# Patient Record
Sex: MALE | Race: WHITE | NOT HISPANIC OR LATINO | Employment: UNEMPLOYED | URBAN - METROPOLITAN AREA
[De-identification: names, ages, dates, MRNs, and addresses within clinical notes are randomized per-mention and may not be internally consistent; named-entity substitution may affect disease eponyms.]

---

## 2023-09-29 ENCOUNTER — HOSPITAL ENCOUNTER (INPATIENT)
Facility: HOSPITAL | Age: 43
LOS: 1 days | Discharge: LEFT BEFORE DISCHARGE | DRG: 638 | End: 2023-09-29
Attending: EMERGENCY MEDICINE | Admitting: INTERNAL MEDICINE
Payer: COMMERCIAL

## 2023-09-29 VITALS
TEMPERATURE: 98 F | RESPIRATION RATE: 23 BRPM | HEIGHT: 72 IN | DIASTOLIC BLOOD PRESSURE: 89 MMHG | WEIGHT: 315 LBS | BODY MASS INDEX: 42.66 KG/M2 | SYSTOLIC BLOOD PRESSURE: 144 MMHG | OXYGEN SATURATION: 96 % | HEART RATE: 74 BPM

## 2023-09-29 DIAGNOSIS — E11.10 DKA (DIABETIC KETOACIDOSIS): ICD-10-CM

## 2023-09-29 DIAGNOSIS — E13.10 DIABETIC KETOACIDOSIS WITHOUT COMA ASSOCIATED WITH OTHER SPECIFIED DIABETES MELLITUS: Primary | ICD-10-CM

## 2023-09-29 DIAGNOSIS — M54.50 ACUTE BILATERAL LOW BACK PAIN WITHOUT SCIATICA: ICD-10-CM

## 2023-09-29 LAB
ALLENS TEST BLOOD GAS (OHS): NORMAL
AMPHET UR QL SCN: NEGATIVE
AMYLASE SERPL-CCNC: 36 UNIT/L (ref 25–125)
ANION GAP SERPL CALC-SCNC: 13 MEQ/L
ANION GAP SERPL CALC-SCNC: 18 MEQ/L
ANION GAP SERPL CALC-SCNC: 21 MEQ/L
APPEARANCE UR: CLEAR
B-OH-BUTYR SERPL-MCNC: 4.7 MMOL/L
BACTERIA #/AREA URNS AUTO: ABNORMAL /HPF
BARBITURATE SCN PRESENT UR: NEGATIVE
BASE EXCESS BLD CALC-SCNC: -1 MMOL/L
BASOPHILS # BLD AUTO: 0.05 X10(3)/MCL
BASOPHILS NFR BLD AUTO: 0.8 %
BENZODIAZ UR QL SCN: NEGATIVE
BILIRUB UR QL STRIP.AUTO: NEGATIVE
BLOOD GAS SAMPLE TYPE (OHS): NORMAL
BNP BLD-MCNC: <10 PG/ML
BUN SERPL-MCNC: 10.9 MG/DL (ref 8.9–20.6)
BUN SERPL-MCNC: 7.3 MG/DL (ref 8.9–20.6)
BUN SERPL-MCNC: 9.9 MG/DL (ref 8.9–20.6)
CALCIUM SERPL-MCNC: 9.1 MG/DL (ref 8.4–10.2)
CALCIUM SERPL-MCNC: 9.2 MG/DL (ref 8.4–10.2)
CALCIUM SERPL-MCNC: 9.2 MG/DL (ref 8.4–10.2)
CANNABINOIDS UR QL SCN: NEGATIVE
CHLORIDE SERPL-SCNC: 106 MMOL/L (ref 98–107)
CHLORIDE SERPL-SCNC: 107 MMOL/L (ref 98–107)
CHLORIDE SERPL-SCNC: 97 MMOL/L (ref 98–107)
CHOLEST SERPL-MCNC: 172 MG/DL
CHOLEST/HDLC SERPL: 8 {RATIO} (ref 0–5)
CO2 BLDA-SCNC: 27.3 MMOL/L
CO2 SERPL-SCNC: 15 MMOL/L (ref 22–29)
CO2 SERPL-SCNC: 15 MMOL/L (ref 22–29)
CO2 SERPL-SCNC: 20 MMOL/L (ref 22–29)
COCAINE UR QL SCN: NEGATIVE
COHGB MFR BLDA: 1.8 %
COLOR UR AUTO: COLORLESS
CREAT SERPL-MCNC: 0.91 MG/DL (ref 0.73–1.18)
CREAT SERPL-MCNC: 0.93 MG/DL (ref 0.73–1.18)
CREAT SERPL-MCNC: 1.3 MG/DL (ref 0.73–1.18)
CREAT/UREA NIT SERPL: 11
CREAT/UREA NIT SERPL: 12
CREAT/UREA NIT SERPL: 6
DRAWN BY BLOOD GAS (OHS): NORMAL
EOSINOPHIL # BLD AUTO: 0.12 X10(3)/MCL (ref 0–0.9)
EOSINOPHIL NFR BLD AUTO: 2 %
ERYTHROCYTE [DISTWIDTH] IN BLOOD BY AUTOMATED COUNT: 12.8 % (ref 11.5–17)
FENTANYL UR QL SCN: NEGATIVE
GFR SERPLBLD CREATININE-BSD FMLA CKD-EPI: >60 MLS/MIN/1.73/M2
GLUCOSE SERPL-MCNC: 140 MG/DL (ref 74–100)
GLUCOSE SERPL-MCNC: 150 MG/DL (ref 74–100)
GLUCOSE SERPL-MCNC: 575 MG/DL (ref 74–100)
GLUCOSE UR QL STRIP.AUTO: ABNORMAL
HCO3 BLDA-SCNC: 25.8 MMOL/L
HCT VFR BLD AUTO: 42.8 % (ref 42–52)
HDLC SERPL-MCNC: 22 MG/DL (ref 35–60)
HGB BLD-MCNC: 14.9 G/DL (ref 14–18)
HYALINE CASTS #/AREA URNS LPF: ABNORMAL /LPF
IMM GRANULOCYTES # BLD AUTO: 0.05 X10(3)/MCL (ref 0–0.04)
IMM GRANULOCYTES NFR BLD AUTO: 0.8 %
KETONES UR QL STRIP.AUTO: ABNORMAL
LDLC SERPL CALC-MCNC: -53 MG/DL (ref 50–140)
LEUKOCYTE ESTERASE UR QL STRIP.AUTO: NEGATIVE
LIPASE SERPL-CCNC: 20 U/L
LYMPHOCYTES # BLD AUTO: 2.2 X10(3)/MCL (ref 0.6–4.6)
LYMPHOCYTES NFR BLD AUTO: 36.4 %
MCH RBC QN AUTO: 28.9 PG (ref 27–31)
MCHC RBC AUTO-ENTMCNC: 34.8 G/DL (ref 33–36)
MCV RBC AUTO: 82.9 FL (ref 80–94)
MDMA UR QL SCN: NEGATIVE
METHGB MFR BLDA: 0.9 %
MONOCYTES # BLD AUTO: 0.75 X10(3)/MCL (ref 0.1–1.3)
MONOCYTES NFR BLD AUTO: 12.4 %
MUCOUS THREADS URNS QL MICRO: ABNORMAL /LPF
NEUTROPHILS # BLD AUTO: 2.88 X10(3)/MCL (ref 2.1–9.2)
NEUTROPHILS NFR BLD AUTO: 47.6 %
NITRITE UR QL STRIP.AUTO: NEGATIVE
NRBC BLD AUTO-RTO: 0 %
O2 HB BLOOD GAS (OHS): 54.8 %
OPIATES UR QL SCN: NEGATIVE
OXYHGB MFR BLDA: 13.9 G/DL
PCO2 BLDA: 50 MMHG (ref 40–50)
PCP UR QL: NEGATIVE
PH BLDA: 7.32 [PH] (ref 7.3–7.4)
PH UR STRIP.AUTO: 5 [PH]
PH UR: 5.5 [PH] (ref 3–11)
PLATELET # BLD AUTO: 219 X10(3)/MCL (ref 130–400)
PMV BLD AUTO: 11.6 FL (ref 7.4–10.4)
PO2 BLDA: 34 MMHG (ref 30–40)
POCT GLUCOSE: 142 MG/DL (ref 70–110)
POCT GLUCOSE: 142 MG/DL (ref 70–110)
POCT GLUCOSE: 146 MG/DL (ref 70–110)
POCT GLUCOSE: 150 MG/DL (ref 70–110)
POCT GLUCOSE: 161 MG/DL (ref 70–110)
POCT GLUCOSE: 164 MG/DL (ref 70–110)
POCT GLUCOSE: 242 MG/DL (ref 70–110)
POCT GLUCOSE: 314 MG/DL (ref 70–110)
POCT GLUCOSE: >500 MG/DL (ref 70–110)
POTASSIUM SERPL-SCNC: 3.6 MMOL/L (ref 3.5–5.1)
POTASSIUM SERPL-SCNC: 4.1 MMOL/L (ref 3.5–5.1)
POTASSIUM SERPL-SCNC: 4.3 MMOL/L (ref 3.5–5.1)
PROT UR QL STRIP.AUTO: NEGATIVE
RBC # BLD AUTO: 5.16 X10(6)/MCL (ref 4.7–6.1)
RBC #/AREA URNS AUTO: ABNORMAL /HPF
RBC UR QL AUTO: NEGATIVE
SAO2 % BLDA: 56.3 %
SODIUM SERPL-SCNC: 133 MMOL/L (ref 136–145)
SODIUM SERPL-SCNC: 139 MMOL/L (ref 136–145)
SODIUM SERPL-SCNC: 140 MMOL/L (ref 136–145)
SP GR UR STRIP.AUTO: 1.03 (ref 1–1.03)
SQUAMOUS #/AREA URNS LPF: ABNORMAL /HPF
T4 FREE SERPL-MCNC: 1.05 NG/DL (ref 0.7–1.48)
TRIGL SERPL-MCNC: 1013 MG/DL (ref 34–140)
TSH SERPL-ACNC: 6.66 UIU/ML (ref 0.35–4.94)
UROBILINOGEN UR STRIP-ACNC: NORMAL
VLDLC SERPL CALC-MCNC: 203 MG/DL
WBC # SPEC AUTO: 6.05 X10(3)/MCL (ref 4.5–11.5)
WBC #/AREA URNS AUTO: ABNORMAL /HPF

## 2023-09-29 PROCEDURE — 85025 COMPLETE CBC W/AUTO DIFF WBC: CPT | Performed by: EMERGENCY MEDICINE

## 2023-09-29 PROCEDURE — 25000003 PHARM REV CODE 250: Performed by: INTERNAL MEDICINE

## 2023-09-29 PROCEDURE — 96361 HYDRATE IV INFUSION ADD-ON: CPT

## 2023-09-29 PROCEDURE — 93005 ELECTROCARDIOGRAM TRACING: CPT

## 2023-09-29 PROCEDURE — 82010 KETONE BODYS QUAN: CPT | Performed by: EMERGENCY MEDICINE

## 2023-09-29 PROCEDURE — 63600175 PHARM REV CODE 636 W HCPCS: Performed by: STUDENT IN AN ORGANIZED HEALTH CARE EDUCATION/TRAINING PROGRAM

## 2023-09-29 PROCEDURE — 96372 THER/PROPH/DIAG INJ SC/IM: CPT | Performed by: EMERGENCY MEDICINE

## 2023-09-29 PROCEDURE — 96374 THER/PROPH/DIAG INJ IV PUSH: CPT

## 2023-09-29 PROCEDURE — 25000003 PHARM REV CODE 250: Performed by: STUDENT IN AN ORGANIZED HEALTH CARE EDUCATION/TRAINING PROGRAM

## 2023-09-29 PROCEDURE — 83690 ASSAY OF LIPASE: CPT | Performed by: STUDENT IN AN ORGANIZED HEALTH CARE EDUCATION/TRAINING PROGRAM

## 2023-09-29 PROCEDURE — 99223 1ST HOSP IP/OBS HIGH 75: CPT | Mod: AI,,, | Performed by: INTERNAL MEDICINE

## 2023-09-29 PROCEDURE — 84439 ASSAY OF FREE THYROXINE: CPT | Performed by: STUDENT IN AN ORGANIZED HEALTH CARE EDUCATION/TRAINING PROGRAM

## 2023-09-29 PROCEDURE — 25000003 PHARM REV CODE 250: Performed by: EMERGENCY MEDICINE

## 2023-09-29 PROCEDURE — 84443 ASSAY THYROID STIM HORMONE: CPT | Performed by: STUDENT IN AN ORGANIZED HEALTH CARE EDUCATION/TRAINING PROGRAM

## 2023-09-29 PROCEDURE — 80061 LIPID PANEL: CPT | Performed by: STUDENT IN AN ORGANIZED HEALTH CARE EDUCATION/TRAINING PROGRAM

## 2023-09-29 PROCEDURE — 80048 BASIC METABOLIC PNL TOTAL CA: CPT | Performed by: STUDENT IN AN ORGANIZED HEALTH CARE EDUCATION/TRAINING PROGRAM

## 2023-09-29 PROCEDURE — 82150 ASSAY OF AMYLASE: CPT | Performed by: STUDENT IN AN ORGANIZED HEALTH CARE EDUCATION/TRAINING PROGRAM

## 2023-09-29 PROCEDURE — 82962 GLUCOSE BLOOD TEST: CPT

## 2023-09-29 PROCEDURE — 20000000 HC ICU ROOM

## 2023-09-29 PROCEDURE — 99291 CRITICAL CARE FIRST HOUR: CPT

## 2023-09-29 PROCEDURE — S5010 5% DEXTROSE AND 0.45% SALINE: HCPCS | Performed by: STUDENT IN AN ORGANIZED HEALTH CARE EDUCATION/TRAINING PROGRAM

## 2023-09-29 PROCEDURE — 63600175 PHARM REV CODE 636 W HCPCS: Performed by: EMERGENCY MEDICINE

## 2023-09-29 PROCEDURE — 80307 DRUG TEST PRSMV CHEM ANLYZR: CPT | Performed by: STUDENT IN AN ORGANIZED HEALTH CARE EDUCATION/TRAINING PROGRAM

## 2023-09-29 PROCEDURE — 80048 BASIC METABOLIC PNL TOTAL CA: CPT | Performed by: EMERGENCY MEDICINE

## 2023-09-29 PROCEDURE — 81001 URINALYSIS AUTO W/SCOPE: CPT | Performed by: EMERGENCY MEDICINE

## 2023-09-29 PROCEDURE — 99223 PR INITIAL HOSPITAL CARE,LEVL III: ICD-10-PCS | Mod: AI,,, | Performed by: INTERNAL MEDICINE

## 2023-09-29 PROCEDURE — 83880 ASSAY OF NATRIURETIC PEPTIDE: CPT | Performed by: EMERGENCY MEDICINE

## 2023-09-29 RX ORDER — ATORVASTATIN CALCIUM 40 MG/1
40 TABLET, FILM COATED ORAL NIGHTLY
Status: DISCONTINUED | OUTPATIENT
Start: 2023-09-29 | End: 2023-09-29 | Stop reason: HOSPADM

## 2023-09-29 RX ORDER — NIFEDIPINE 30 MG/1
30 TABLET, EXTENDED RELEASE ORAL DAILY
Status: DISCONTINUED | OUTPATIENT
Start: 2023-09-29 | End: 2023-09-29 | Stop reason: HOSPADM

## 2023-09-29 RX ORDER — SODIUM CHLORIDE 0.9 % (FLUSH) 0.9 %
10 SYRINGE (ML) INJECTION
Status: DISCONTINUED | OUTPATIENT
Start: 2023-09-29 | End: 2023-09-29 | Stop reason: HOSPADM

## 2023-09-29 RX ORDER — LABETALOL HCL 20 MG/4 ML
10 SYRINGE (ML) INTRAVENOUS EVERY 4 HOURS PRN
Status: DISCONTINUED | OUTPATIENT
Start: 2023-09-29 | End: 2023-09-29 | Stop reason: HOSPADM

## 2023-09-29 RX ORDER — DEXTROSE MONOHYDRATE AND SODIUM CHLORIDE 5; .45 G/100ML; G/100ML
INJECTION, SOLUTION INTRAVENOUS CONTINUOUS PRN
Status: DISCONTINUED | OUTPATIENT
Start: 2023-09-29 | End: 2023-09-29 | Stop reason: HOSPADM

## 2023-09-29 RX ORDER — FAMOTIDINE 10 MG/ML
20 INJECTION INTRAVENOUS DAILY
Status: DISCONTINUED | OUTPATIENT
Start: 2023-09-29 | End: 2023-09-29 | Stop reason: HOSPADM

## 2023-09-29 RX ORDER — ONDANSETRON 2 MG/ML
4 INJECTION INTRAMUSCULAR; INTRAVENOUS EVERY 8 HOURS PRN
Status: DISCONTINUED | OUTPATIENT
Start: 2023-09-29 | End: 2023-09-29 | Stop reason: HOSPADM

## 2023-09-29 RX ORDER — SODIUM CHLORIDE 9 MG/ML
1000 INJECTION, SOLUTION INTRAVENOUS CONTINUOUS
Status: ACTIVE | OUTPATIENT
Start: 2023-09-29 | End: 2023-09-29

## 2023-09-29 RX ORDER — HEPARIN SODIUM 5000 [USP'U]/ML
7500 INJECTION, SOLUTION INTRAVENOUS; SUBCUTANEOUS EVERY 12 HOURS
Status: DISCONTINUED | OUTPATIENT
Start: 2023-09-29 | End: 2023-09-29 | Stop reason: HOSPADM

## 2023-09-29 RX ORDER — SODIUM CHLORIDE 9 MG/ML
1000 INJECTION, SOLUTION INTRAVENOUS
Status: COMPLETED | OUTPATIENT
Start: 2023-09-29 | End: 2023-09-29

## 2023-09-29 RX ORDER — MUPIROCIN 20 MG/G
OINTMENT TOPICAL 2 TIMES DAILY
Status: DISCONTINUED | OUTPATIENT
Start: 2023-09-29 | End: 2023-09-29 | Stop reason: HOSPADM

## 2023-09-29 RX ORDER — DEXTROSE MONOHYDRATE 100 MG/ML
INJECTION, SOLUTION INTRAVENOUS
Status: DISCONTINUED | OUTPATIENT
Start: 2023-09-29 | End: 2023-09-29 | Stop reason: HOSPADM

## 2023-09-29 RX ORDER — POTASSIUM CHLORIDE 7.45 MG/ML
40 INJECTION INTRAVENOUS
Status: DISCONTINUED | OUTPATIENT
Start: 2023-09-29 | End: 2023-09-29 | Stop reason: HOSPADM

## 2023-09-29 RX ORDER — POTASSIUM CHLORIDE 7.45 MG/ML
60 INJECTION INTRAVENOUS
Status: DISCONTINUED | OUTPATIENT
Start: 2023-09-29 | End: 2023-09-29 | Stop reason: HOSPADM

## 2023-09-29 RX ORDER — POTASSIUM CHLORIDE 20 MEQ/1
40 TABLET, EXTENDED RELEASE ORAL 2 TIMES DAILY
Status: DISCONTINUED | OUTPATIENT
Start: 2023-09-29 | End: 2023-09-29 | Stop reason: HOSPADM

## 2023-09-29 RX ORDER — KETOROLAC TROMETHAMINE 30 MG/ML
15 INJECTION, SOLUTION INTRAMUSCULAR; INTRAVENOUS
Status: COMPLETED | OUTPATIENT
Start: 2023-09-29 | End: 2023-09-29

## 2023-09-29 RX ORDER — POTASSIUM CHLORIDE 7.45 MG/ML
80 INJECTION INTRAVENOUS
Status: DISCONTINUED | OUTPATIENT
Start: 2023-09-29 | End: 2023-09-29 | Stop reason: HOSPADM

## 2023-09-29 RX ORDER — ACETAMINOPHEN 325 MG/1
650 TABLET ORAL EVERY 8 HOURS PRN
Status: DISCONTINUED | OUTPATIENT
Start: 2023-09-29 | End: 2023-09-29 | Stop reason: HOSPADM

## 2023-09-29 RX ORDER — TALC
6 POWDER (GRAM) TOPICAL NIGHTLY PRN
Status: DISCONTINUED | OUTPATIENT
Start: 2023-09-29 | End: 2023-09-29 | Stop reason: HOSPADM

## 2023-09-29 RX ADMIN — SODIUM CHLORIDE 1000 ML: 9 INJECTION, SOLUTION INTRAVENOUS at 09:09

## 2023-09-29 RX ADMIN — FAMOTIDINE 20 MG: 10 INJECTION, SOLUTION INTRAVENOUS at 09:09

## 2023-09-29 RX ADMIN — MUPIROCIN: 20 OINTMENT TOPICAL at 11:09

## 2023-09-29 RX ADMIN — KETOROLAC TROMETHAMINE 15 MG: 30 INJECTION, SOLUTION INTRAMUSCULAR; INTRAVENOUS at 06:09

## 2023-09-29 RX ADMIN — INSULIN HUMAN 0.02 UNITS/KG/HR: 1 INJECTION, SOLUTION INTRAVENOUS at 04:09

## 2023-09-29 RX ADMIN — POTASSIUM CHLORIDE 40 MEQ: 1500 TABLET, EXTENDED RELEASE ORAL at 03:09

## 2023-09-29 RX ADMIN — SODIUM CHLORIDE 1000 ML: 9 INJECTION, SOLUTION INTRAVENOUS at 08:09

## 2023-09-29 RX ADMIN — INSULIN HUMAN 0.1 UNITS/KG/HR: 1 INJECTION, SOLUTION INTRAVENOUS at 08:09

## 2023-09-29 RX ADMIN — HEPARIN SODIUM 7500 UNITS: 5000 INJECTION, SOLUTION INTRAVENOUS; SUBCUTANEOUS at 09:09

## 2023-09-29 RX ADMIN — DEXTROSE AND SODIUM CHLORIDE: 5; 450 INJECTION, SOLUTION INTRAVENOUS at 11:09

## 2023-09-29 RX ADMIN — NIFEDIPINE 30 MG: 30 TABLET, FILM COATED, EXTENDED RELEASE ORAL at 11:09

## 2023-09-29 NOTE — PLAN OF CARE
09/29/23 1420   Discharge Assessment   Assessment Type Discharge Planning Assessment   Confirmed/corrected address, phone number and insurance Yes   Confirmed Demographics Correct on Facesheet   Source of Information patient   Reason For Admission DKA (diabetic ketoacidosis)  E13.10 Diabetic ketoacidosis without coma associated with other specified diabetes mellitus  M54.50 Acute bilateral low back pain without sciatica   People in Home grandparent(s)   Facility Arrived From: Home   Do you expect to return to your current living situation? Yes   Do you have help at home or someone to help you manage your care at home? Yes   Who are your caregiver(s) and their phone number(s)? Alejandra Garcia (Mother)   [phone]   Prior to hospitilization cognitive status: Alert/Oriented   Current cognitive status: Alert/Oriented   Equipment Currently Used at Home   (Back Brace)   Readmission within 30 days? No   Patient currently being followed by outpatient case management? No   Do you currently have service(s) that help you manage your care at home? No   Do you take prescription medications? Yes   Do you have prescription coverage? Yes   Coverage [employer] M/D   Do you have any problems affording any of your prescribed medications? No   Is the patient taking medications as prescribed? yes   Who is going to help you get home at discharge? Family   How do you get to doctors appointments? car, drives self   Are you on dialysis? No   DME Needed Upon Discharge  none   Discharge Plan discussed with: Patient   Transition of Care Barriers None   Discharge Plan A Home with family   Physical Activity   On average, how many days per week do you engage in moderate to strenuous exercise (like a brisk walk)? 0 days   On average, how many minutes do you engage in exercise at this level? 0 min   Financial Resource Strain   How hard is it for you to pay for the very basics like food, housing, medical care, and heating? Not hard   Housing  Stability   In the last 12 months, was there a time when you were not able to pay the mortgage or rent on time? N   In the last 12 months, how many places have you lived? 1   In the last 12 months, was there a time when you did not have a steady place to sleep or slept in a shelter (including now)? N   Transportation Needs   In the past 12 months, has lack of transportation kept you from medical appointments or from getting medications? no   In the past 12 months, has lack of transportation kept you from meetings, work, or from getting things needed for daily living? No   Food Insecurity   Within the past 12 months, you worried that your food would run out before you got the money to buy more. Never true   Within the past 12 months, the food you bought just didn't last and you didn't have money to get more. Never true   Social Connections   In a typical week, how many times do you talk on the phone with family, friends, or neighbors? More than 3   How often do you get together with friends or relatives? More than 3   How often do you attend Methodist or Gnosticist services? More than 4  (Non Mormonism)   Do you belong to any clubs or organizations such as Methodist groups, unions, fraternal or athletic groups, or school groups? No   How often do you attend meetings of the clubs or organizations you belong to? Never   Are you , , , , never , or living with a partner? Never marrie   Alcohol Use   Q1: How often do you have a drink containing alcohol? Never   Q2: How many drinks containing alcohol do you have on a typical day when you are drinking? None   Q3: How often do you have six or more drinks on one occasion? Never   OTHER   Name(s) of People in Home Alejandra Garcia (Mother)   [phone]     Pt single with 2 sons; Resides in fly home where he cares for grandmother; Pt insured through [employer] due to work related injury with [employer], in addition to  [place] [employer]; Mother, Alejandra, emergency contact; Independent with ADL's; Home address corrected in Epic.

## 2023-09-29 NOTE — H&P
[facility] - Emergency Dept  Pulmonary Critical Care Note    Patient Name: Arnoldo Crane Jr.  MRN: [id number]  Admission Date: 9/29/2023  Hospital Length of Stay: 0 days  Code Status: Full Code  Attending Provider: Anuel Donovan Jr., MD,*  Primary Care Provider: No primary care provider on file.     Subjective:     HPI:   Arnoldo Crane Jr. is a 43 y.o. male with PMH of depression and insomnia, who presented to the ED on 9/29/2023 with CC of lower back pain that has been persisting for the past 3 weeks. He states that pain is 5 out of 10, it is intermittent, dull, and achy, it is aggravated by physical activities and partially relieved by OTC ibuprofen. He visited an urgent care clinic in August 2023 and was prescribed with pain meds, another visit made in September 2023 and was prescribed with Prednisone 10mg daily x 10days. He states that he fell from an 8 feet high platform at the age of 14 and back pain has been persisting since then. Other associating symptoms as follow: (1) Polydipsia and polyuria that have been persisting for the past 3 weeks; he drinks approximately 6-7 48oz bottle of water/day and having to urinate at least once every hour. (2) Approximately 4-5 loose stools per day for the past 3 weeks. In the ED: vital signs stable; labs significant for creatinine 1.30, bicarb 15, BHOB 4.7, serum glucose of 575, U/A revealed 3+ ketones. Admitted to ICU for DKA protocol.     Hospital Course/Significant events:  9/29/2023 - Admitted to ICU for DKA protocol     24 Hour Interval History:  Pending     No past medical history on file.    No past surgical history on file.    Social History     Socioeconomic History    Marital status: Single       No current outpatient medications  Current Inpatient Medications   famotidine (PF)  20 mg Intravenous Daily    heparin (porcine)  7,500 Units Subcutaneous Q12H     Current Intravenous Infusions   sodium chloride 0.9% 1,000 mL (09/29/23 6916)     dextrose 5 % and 0.45 % NaCl      insulin regular 1 units/mL infusion orderable (DKA) 0.1 Units/kg/hr (09/29/23 0818)     Review of Systems   Respiratory:  Negative for shortness of breath.    Cardiovascular:  Negative for chest pain and palpitations.   Gastrointestinal:  Positive for abdominal pain and nausea.   Genitourinary:  Positive for frequency.        Polydipsia   Neurological:  Negative for focal weakness.        Objective:   No intake or output data in the 24 hours ending 09/29/23 0953  Vital Signs (Most Recent):  Temp: 96.8 °F (36 °C) (09/29/23 0558)  Pulse: 84 (09/29/23 0813)  Resp: 20 (09/29/23 0558)  BP: (!) 156/93 (09/29/23 0813)  SpO2: 97 % (09/29/23 0813)  Body mass index is 43.54 kg/m².  Weight: (!) 145.6 kg (321 lb) Vital Signs (24h Range):  Temp:  [96.8 °F (36 °C)] 96.8 °F (36 °C)  Pulse:  [] 84  Resp:  [20] 20  SpO2:  [97 %-98 %] 97 %  BP: (156)/() 156/93     Physical Exam  General: Obese w/ mild distress  HEENT: NC/AT; PERRL; nasal and oral mucosa moist and clear  Pulm: CTA bilaterally, normal work of breathing on room air  CV: S1, S2 w/o murmurs or gallops; no edema noted  GI: Truncal obesity, bowel sound present in all quadrants, abdomen soft to palpation  MSK: Full ROM of all extremities  Derm: Tattoos on left arm  Neuro: AAOx3; motor/sensory function intact  Psych: Cooperative; appropriate mood and affect    Lines/Drains/Airways       Peripheral Intravenous Line  Duration                  Peripheral IV - Single Lumen 09/29/23 0730 20 G Left;Posterior Wrist <1 day                  Significant Labs:  Lab Results   Component Value Date    WBC 6.05 09/29/2023    HGB 14.9 09/29/2023    HCT 42.8 09/29/2023    MCV 82.9 09/29/2023     09/29/2023       BMP  Lab Results   Component Value Date     (L) 09/29/2023    K 4.3 09/29/2023    CHLORIDE 97 (L) 09/29/2023    CO2 15 (L) 09/29/2023    BUN 7.3 (L) 09/29/2023    CREATININE 1.30 (H) 09/29/2023    CALCIUM 9.2 09/29/2023     AGAP 21.0 09/29/2023    EGFRNONAA >60 12/10/2017     ABG  Recent Labs   Lab 09/29/23  0859   PH 7.320   PO2 34.0   PCO2 50.0   HCO3 25.8     Mechanical Ventilation Support:       Significant Imaging:  I have reviewed the pertinent imaging within the past 24 hours.    Assessment/Plan:     Assessment  DKA  Labs on admission: bicarb 15, BHOB 4.7, serum glucose of 575, U/A revealed 3+ ketones  Hypertriglyceridemia  Lipid panel on admission revealed triglyceride level of 1013  ELMER  No baseline creatinine, creatinine 1.3 on admission  Subclinical hypothyroidism  TSH 6.657 and T4 njormal at 1.05  HX of depression, insomnia    Plan  -Admitted to ICU for DKA protocol   -Will continue insulin drip until bicarb >18 with gap closed in two consecutive repeat BMPs; when gap is closed, will transition to detemir BID based on 0.25u/kg/day  -Obtained a lipid panel which revealed a triglyceride of 1013; will obtain lipase and amylase and consider CT ab/pelvis if levels are elevated    DVT Prophylaxis: Lovenox  GI Prophylaxis: Famotidine      32 minutes of critical care was time spent personally by me on the following activities: development of treatment plan with patient or surrogate and bedside caregivers, discussions with consultants, evaluation of patient's response to treatment, examination of patient, ordering and performing treatments and interventions, ordering and review of laboratory studies, ordering and review of radiographic studies, pulse oximetry, re-evaluation of patient's condition.  This critical care time did not overlap with that of any other provider or involve time for any procedures.     Melany Paredes DO, PGY-II  Pulmonary Critical Care Medicine  [facility] - Emergency Dept

## 2023-09-29 NOTE — PLAN OF CARE
Problem: Adult Inpatient Plan of Care  Goal: Plan of Care Review  Outcome: Ongoing, Progressing  Goal: Patient-Specific Goal (Individualized)  Outcome: Ongoing, Progressing  Goal: Absence of Hospital-Acquired Illness or Injury  Outcome: Ongoing, Progressing  Goal: Optimal Comfort and Wellbeing  Outcome: Ongoing, Progressing  Goal: Readiness for Transition of Care  Outcome: Ongoing, Progressing     Problem: Bariatric Environmental Safety  Goal: Safety Maintained with Care  Outcome: Ongoing, Progressing     Problem: Pain Acute  Goal: Acceptable Pain Control and Functional Ability  Outcome: Ongoing, Progressing     Problem: Behavioral Health Comorbidity  Goal: Maintenance of Behavioral Health Symptom Control  Outcome: Ongoing, Progressing     Problem: Obstructive Sleep Apnea Risk or Actual Comorbidity Management  Goal: Unobstructed Breathing During Sleep  Outcome: Ongoing, Progressing     Problem: Pain Chronic (Persistent) (Comorbidity Management)  Goal: Acceptable Pain Control and Functional Ability  Outcome: Ongoing, Progressing     Problem: Diabetes Comorbidity  Goal: Blood Glucose Level Within Targeted Range  Outcome: Ongoing, Progressing

## 2023-09-29 NOTE — PROGRESS NOTES
Inpatient Nutrition Evaluation    Admit Date: 9/29/2023   Total duration of encounter: 1 day    Nutrition Recommendation/Prescription     When appropriate--ADAT to diabetic/ cardiac  Pt education on diet/complete  MVI/fe  Biweekly wt  Will monitor nutrition status     Nutrition Assessment     Chart Review    Reason Seen: continuous nutrition monitoring    Malnutrition Screening Tool Results   Have you recently lost weight without trying?: No  Have you been eating poorly because of a decreased appetite?: No   MST Score: 0     Diagnosis:  DKA, hypertriglyceridemia, EMLER, hypothyroid     Relevant Medical History: depression     Nutrition-Related Medications: insulin, atorvastatin, famotidine     Nutrition-Related Labs:  (9/29) H/H 14.9/42.8 Gluc 575(H) Bun 7.3 Cr 1.3 Na 133(L) AST 71(H) ALT 66(H) TG 1013(H)     Diet Order: Diet NPO  Oral Supplement Order: none  Appetite/Oral Intake: NPO/NPO  Factors Affecting Nutritional Intake: NPO  Food/Latter day/Cultural Preferences: none reported  Food Allergies: none reported       Wound(s):   none    Comments    (9/29) Pt admitted with elevated gluc ; DKA; no hx DM; reported had been on high dose steroid for back injury; ? New onset DM. No wt loss; good appetite. Abnormal labs noted: Gluc (H); TG (H). Discussed diabetic diet.     Anthropometrics    Height: 6' (182.9 cm) Height Method: Stated  Last Weight: (!) 145.6 kg (321 lb) (09/29/23 0558) Weight Method: Standard Scale  BMI (Calculated): 43.5  BMI Classification: obese grade III (BMI >/=40)        Ideal Body Weight (IBW), Male: 178 lb     % Ideal Body Weight, Male (lb): 180.34 %                 Usual Body Weight (UBW), kg: (!) 145.6 kg  % Usual Body Weight: 100.21     Usual Weight Provided By: patient and EMR weight history    Wt Readings from Last 5 Encounters:   09/29/23 (!) 145.6 kg (321 lb)     Weight Change(s) Since Admission:  Admit Weight: (!) 145.6 kg (321 lb) (09/29/23 0558)  No wt loss     Patient  Education    Education Provided: diabetic diet  Teaching Method: explanation and printed materials  Comprehension: verbalizes understanding  Barriers to Learning: none evident  Expected Compliance: fair  Comments: All questions were answered and dietitian's contact information was provided.     Monitoring & Evaluation     Dietitian will monitor food and beverage intake, weight, and food/nutrition knowledge skill.  Nutrition Risk/Follow-Up: low (follow-up in 5-7 days)  Patients assigned 'low nutrition risk' status do not qualify for a full nutritional assessment but will be monitored and re-evaluated in a 5-7 day time period. Please consult if re-evaluation needed sooner.

## 2023-09-29 NOTE — ED PROVIDER NOTES
ED PROVIDER NOTE  9/29/2023    CHIEF COMPLAINT:   Chief Complaint   Patient presents with    Back Pain     Chronic back pain       HISTORY OF PRESENT ILLNESS:   Arnoldo Crane Jr. is a 43 y.o. male who presents with chief complaint Back pain.  Onset was about 4 weeks ago when he states he was going to lift something at work and felt a sharp pain across his low back.  He states that since then he is had this constant pain across his back that does not radiate into his legs that is aggravated with activity such as prolonged standing, states it improves with rest and use of cold therapy.  He reports being seen at outside facility and had some x-rays and was started on Robaxin and prednisone which he states is not helping, he takes ibuprofen on occasion but is not on any consistent NSAID regimen.  He reports that around the time of injuring his back he began noticing some increased thirst and urination along with fatigue, has had some nausea but no vomiting. Denies any dysuria or hematuria.  Denies numbness or weakness in extremities, saddle anesthesia, bowel or bladder incontinence.    The history is provided by the patient.         REVIEW OF SYSTEMS: as noted in the HPI.  NURSING NOTES REVIEWED      PAST MEDICAL/SURGICAL HISTORY: No past medical history on file. No past surgical history on file.    FAMILY HISTORY: No family history on file.    SOCIAL HISTORY:      ALLERGIES: Review of patient's allergies indicates:  No Known Allergies    PHYSICAL EXAM:  Initial Vitals [09/29/23 0558]   BP Pulse Resp Temp SpO2   (!) 156/100 108 20 96.8 °F (36 °C) 98 %      MAP       --         Physical Exam    Nursing note and vitals reviewed.  Constitutional: He appears well-developed and well-nourished. No distress.   HENT:   Head: Normocephalic and atraumatic.   Nose: Nose normal.   Mouth/Throat: Oropharynx is clear and moist and mucous membranes are normal.   Eyes: Conjunctivae and EOM are normal. Pupils are equal, round, and  reactive to light.   Neck: Neck supple. No tracheal deviation present.   Cardiovascular:  Normal rate, regular rhythm, normal heart sounds, intact distal pulses and normal pulses.           Pulmonary/Chest: Effort normal and breath sounds normal. No respiratory distress.   Abdominal: Abdomen is soft. There is no abdominal tenderness. There is no rebound and no guarding.   Musculoskeletal:      Cervical back: Neck supple.      Thoracic back: No bony tenderness.      Lumbar back: Tenderness present. No bony tenderness.        Back:      Neurological: He is alert and oriented to person, place, and time. GCS eye subscore is 4. GCS verbal subscore is 5. GCS motor subscore is 6.   CN II-XII intact. Moves all extremities. No gross sensory or motor deficits.   Skin: Skin is warm, dry and intact.   Psychiatric: He has a normal mood and affect. His speech is normal and behavior is normal. Judgment and thought content normal. Cognition and memory are normal.         RESULTS:  Labs Reviewed   BASIC METABOLIC PANEL - Abnormal; Notable for the following components:       Result Value    Sodium Level 133 (*)     Chloride 97 (*)     Carbon Dioxide 15 (*)     Glucose Level 575 (*)     Blood Urea Nitrogen 7.3 (*)     Creatinine 1.30 (*)     All other components within normal limits   URINALYSIS, REFLEX TO URINE CULTURE - Abnormal; Notable for the following components:    Color, UA Colorless (*)     Specific Gravity, UA 1.034 (*)     Glucose, UA 4+ (*)     Ketones, UA 3+ (*)     Mucous, UA Trace (*)     All other components within normal limits   CBC WITH DIFFERENTIAL - Abnormal; Notable for the following components:    MPV 11.6 (*)     IG# 0.05 (*)     All other components within normal limits   CBC W/ AUTO DIFFERENTIAL    Narrative:     The following orders were created for panel order CBC auto differential.  Procedure                               Abnormality         Status                     ---------                                -----------         ------                     CBC with Differential[[id number]]        Abnormal            Final result                 Please view results for these tests on the individual orders.   EXTRA TUBES    Narrative:     The following orders were created for panel order EXTRA TUBES.  Procedure                               Abnormality         Status                     ---------                               -----------         ------                     Light Blue Top Hold[[id number]]                              In process                 Lavender Top Hold[[id number]]                                In process                 Gold Top Hold[[id number]]                                    In process                   Please view results for these tests on the individual orders.   LIGHT BLUE TOP HOLD   LAVENDER TOP HOLD   GOLD TOP HOLD   BETA - HYDROXYBUTYRATE, SERUM   POCT GLUCOSE MONITORING CONTINUOUS     Imaging Results    None         PROCEDURES:  Critical Care    Date/Time: 9/29/2023 7:22 AM    Performed by: Maurice Crow DO  Authorized by: Eric Fonseca MD  Direct patient critical care time: 20 minutes  Additional history critical care time: 1 minutes  Ordering / reviewing critical care time: 5 minutes  Documentation critical care time: 5 minutes  Total critical care time (exclusive of procedural time) : 31 minutes  Critical care time was exclusive of separately billable procedures and treating other patients.  Critical care was necessary to treat or prevent imminent or life-threatening deterioration of the following conditions: endocrine crisis, metabolic crisis and renal failure.  Critical care was time spent personally by me on the following activities: development of treatment plan with patient or surrogate, evaluation of patient's response to treatment, examination of patient, obtaining history from patient or surrogate, ordering and performing treatments and interventions, ordering  and review of laboratory studies, re-evaluation of patient's condition and review of old charts.          ECG:  EKG Readings: (Independently Interpreted)   Initial Reading: No STEMI. Rhythm: Normal Sinus Rhythm. Heart Rate: 86. Ectopy: No Ectopy. Conduction: Normal. ST Segments: Normal ST Segments. T Waves: Normal. Clinical Impression: Normal Sinus Rhythm       ED COURSE AND MEDICAL DECISION MAKING:  Medications   sodium chloride 0.9% flush 10 mL (has no administration in time range)   0.9%  NaCl infusion (has no administration in time range)   dextrose 5 % and 0.45 % NaCl infusion (has no administration in time range)   dextrose 10 % infusion (has no administration in time range)   dextrose 10 % infusion (has no administration in time range)   insulin regular bolus from bag/infusion 14.56 Units 14.56 mL (has no administration in time range)   insulin regular in 0.9 % NaCl 100 unit/100 mL (1 unit/mL) infusion (has no administration in time range)   dextrose 10% bolus 125 mL 125 mL (has no administration in time range)   dextrose 10% bolus 250 mL 250 mL (has no administration in time range)   ketorolac injection 15 mg (15 mg Intramuscular Given 9/29/23 0629)     ED Course as of 09/29/23 0724   Fri Sep 29, 2023   0639 WBC: 6.05 [IB]   0639 Hemoglobin: 14.9 [IB]   0639 Platelets: 219 [IB]   0657 Anion Gap: 21.0 [IB]   0658 CO2(!): 15 [IB]   0658 Glucose(!!): 575 [IB]   0658 Ketones, UA(!): 3+ [IB]      ED Course User Index  [IB] Maurice Crow, DO        Medical Decision Making  44 yo male PMHx Depression/Anxiety, presents with polyuria and polydipsia over the past 3-4 weeks having fatigue. He reports that around the time that these symptoms began he had injured his low back at work lifting something and has been having some pain across his low back, does not radiate down his legs and is not having any numbness or weakness or incontinence, but he was not sure if maybe his increased thirst and urination had something to  do with his back problem.  He was on a course of prednisone but he states this was after his symptoms had began.  Discussed concern for undiagnosed diabetes given his polyuria and polydipsia so labs were obtained to evaluate for hyperglycemia.  CBC is unremarkable.  BMP shows glucose 575 with bicarb 15, anion gap 21, K 4.3, creatinine 1.30, this was 1.03 back in 2017.  UA shows 4+ glucose with 3+ ketones, no evidence of infection.  Labs consistent with DKA.  We will check VBG and beta hydroxybutyrate and start on insulin bolus and drip.  Labs discussed with the patient and need for admission.  I have spoken with the patient and/or caregivers. I have explained the patient's condition, diagnoses and treatment plan based on the information available to me at this time. I have answered the patient's and/or caregiver's questions and addressed any concerns. The patient and/or caregivers have as good an understanding of the patient's diagnosis, condition and treatment plan as can be expected at this point. The patient has been stabilized within the capability of the emergency department. The patient will be transported for further care and management or will be moved to an observation or inpatient service. I have communicated with the staff or medical practitioner taking over this patient's care.    Amount and/or Complexity of Data Reviewed  External Data Reviewed: labs, radiology and notes.  Labs: ordered. Decision-making details documented in ED Course.    Risk  Prescription drug management.  Drug therapy requiring intensive monitoring for toxicity.  Decision regarding hospitalization.    Critical Care  Total time providing critical care: 31 minutes        CLINICAL IMPRESSION:  1. Diabetic ketoacidosis without coma associated with other specified diabetes mellitus    2. Acute bilateral low back pain without sciatica    3. DKA (diabetic ketoacidosis)        DISPOSITION:   ED Disposition Condition    Admit Stable                     Maurice Crow,   09/29/23 0725       Eric Fonseca MD  09/29/23 0800

## 2023-09-30 LAB — POCT GLUCOSE: 144 MG/DL (ref 70–110)

## 2023-09-30 NOTE — DISCHARGE SUMMARY
[facility] LEAVING AGAINST MEDICAL ADVICE NOTE    Admitting Physician: Anuel Donovan Jr., MD, [facility]  Attending Physician: No att. providers found  Date of Admit: 9/29/2023  Date of AMA: 9/29/2023    Brief History of Present Illness      Arnoldo Crane Jr. is a 43 y.o. male with PMH of depression and insomnia, who presented to the ED on 9/29/2023 with CC of lower back pain that has been persisting for the past 3 weeks. He states that pain is 5 out of 10, it is intermittent, dull, and achy, it is aggravated by physical activities and partially relieved by OTC ibuprofen. He visited an urgent care clinic in August 2023 and was prescribed with pain meds, another visit made in September 2023 and was prescribed with Prednisone 10mg daily x 10days. He states that he fell from an 8 feet high platform at the age of 14 and back pain has been persisting since then. Other associating symptoms as follow: (1) Polydipsia and polyuria that have been persisting for the past 3 weeks; he drinks approximately 6-7 48oz bottle of water/day and having to urinate at least once every hour. (2) Approximately 4-5 loose stools per day for the past 3 weeks. In the ED: vital signs stable; labs significant for creatinine 1.30, bicarb 15, BHOB 4.7, serum glucose of 575, U/A revealed 3+ ketones. Admitted to ICU for DKA protocol.     Discharge Information:     This patient is choosing to leave against medical advice despite being actively treated for DKA and hypertriglyceridemia. I have personally explained to patient the risks and that choosing to do so may result in permanent bodily harm or even death. Discussed at great length that without further evaluation and monitoring there may have unforeseen circumstances and/or deterioration causing permanent bodily harm or death as a result of leaving against medical advice. Patient verbalizes these risks back to me in layman terms. Patient still desires to leave against medical advice. Patient is alert,  oriented, and shows the mental capacity to make clear decisions regarding his health care at this time. He refused any follow-up for further evaluation of his medical condition. Advised patient to return to ED at any time immediately if he changes mind at any time or if condition begins to worsen or change in anyway.     Melany Paredes,   [facility] Internal Medicine PGY-II

## 2023-10-02 ENCOUNTER — PATIENT MESSAGE (OUTPATIENT)
Dept: FAMILY MEDICINE | Facility: CLINIC | Age: 43
End: 2023-10-02
Payer: MEDICAID

## 2023-10-11 ENCOUNTER — CLINICAL SUPPORT (OUTPATIENT)
Dept: FAMILY MEDICINE | Facility: CLINIC | Age: 43
End: 2023-10-11
Attending: NURSE PRACTITIONER
Payer: MEDICAID

## 2023-10-11 ENCOUNTER — OFFICE VISIT (OUTPATIENT)
Dept: FAMILY MEDICINE | Facility: CLINIC | Age: 43
End: 2023-10-11
Payer: MEDICAID

## 2023-10-11 VITALS
SYSTOLIC BLOOD PRESSURE: 105 MMHG | BODY MASS INDEX: 42.66 KG/M2 | HEART RATE: 86 BPM | TEMPERATURE: 98 F | WEIGHT: 315 LBS | OXYGEN SATURATION: 95 % | HEIGHT: 72 IN | DIASTOLIC BLOOD PRESSURE: 72 MMHG

## 2023-10-11 DIAGNOSIS — F41.9 ANXIETY AND DEPRESSION: ICD-10-CM

## 2023-10-11 DIAGNOSIS — F51.04 PSYCHOPHYSIOLOGICAL INSOMNIA: ICD-10-CM

## 2023-10-11 DIAGNOSIS — F32.A ANXIETY AND DEPRESSION: ICD-10-CM

## 2023-10-11 DIAGNOSIS — E11.65 TYPE 2 DIABETES MELLITUS WITH HYPERGLYCEMIA, WITHOUT LONG-TERM CURRENT USE OF INSULIN: ICD-10-CM

## 2023-10-11 DIAGNOSIS — B35.3 TINEA PEDIS OF BOTH FEET: ICD-10-CM

## 2023-10-11 DIAGNOSIS — Z13.31 POSITIVE DEPRESSION SCREENING: ICD-10-CM

## 2023-10-11 DIAGNOSIS — E11.65 TYPE 2 DIABETES MELLITUS WITH HYPERGLYCEMIA, WITHOUT LONG-TERM CURRENT USE OF INSULIN: Primary | ICD-10-CM

## 2023-10-11 LAB
ALBUMIN SERPL-MCNC: 4.2 G/DL (ref 3.5–5)
ALBUMIN/GLOB SERPL: 0.8 RATIO (ref 1.1–2)
ALP SERPL-CCNC: 73 UNIT/L (ref 40–150)
ALT SERPL-CCNC: 35 UNIT/L (ref 0–55)
AST SERPL-CCNC: 19 UNIT/L (ref 5–34)
BILIRUB SERPL-MCNC: 0.7 MG/DL
BUN SERPL-MCNC: 13 MG/DL (ref 8.9–20.6)
CALCIUM SERPL-MCNC: 10.1 MG/DL (ref 8.4–10.2)
CHLORIDE SERPL-SCNC: 98 MMOL/L (ref 98–107)
CO2 SERPL-SCNC: 18 MMOL/L (ref 22–29)
CREAT SERPL-MCNC: 1.04 MG/DL (ref 0.73–1.18)
CREAT UR-MCNC: 73.4 MG/DL (ref 63–166)
EST. AVERAGE GLUCOSE BLD GHB EST-MCNC: 277.6 MG/DL
GFR SERPLBLD CREATININE-BSD FMLA CKD-EPI: >60 MLS/MIN/1.73/M2
GLOBULIN SER-MCNC: 5.4 GM/DL (ref 2.4–3.5)
GLUCOSE SERPL-MCNC: 366 MG/DL (ref 74–100)
HBA1C MFR BLD: 11.3 %
MICROALBUMIN UR-MCNC: 22.9 UG/ML
MICROALBUMIN/CREAT RATIO PNL UR: 31.2 MG/GM CR (ref 0–30)
POTASSIUM SERPL-SCNC: 4.5 MMOL/L (ref 3.5–5.1)
PROT SERPL-MCNC: 9.6 GM/DL (ref 6.4–8.3)
SODIUM SERPL-SCNC: 133 MMOL/L (ref 136–145)

## 2023-10-11 PROCEDURE — 1111F PR DISCHARGE MEDS RECONCILED W/ CURRENT OUTPATIENT MED LIST: ICD-10-PCS | Mod: CPTII,,, | Performed by: NURSE PRACTITIONER

## 2023-10-11 PROCEDURE — 84681 ASSAY OF C-PEPTIDE: CPT | Performed by: NURSE PRACTITIONER

## 2023-10-11 PROCEDURE — 99204 PR OFFICE/OUTPT VISIT, NEW, LEVL IV, 45-59 MIN: ICD-10-PCS | Mod: S$PBB,,, | Performed by: NURSE PRACTITIONER

## 2023-10-11 PROCEDURE — 99204 OFFICE O/P NEW MOD 45 MIN: CPT | Mod: S$PBB,,, | Performed by: NURSE PRACTITIONER

## 2023-10-11 PROCEDURE — 1160F PR REVIEW ALL MEDS BY PRESCRIBER/CLIN PHARMACIST DOCUMENTED: ICD-10-PCS | Mod: CPTII,,, | Performed by: NURSE PRACTITIONER

## 2023-10-11 PROCEDURE — 80053 COMPREHEN METABOLIC PANEL: CPT | Performed by: NURSE PRACTITIONER

## 2023-10-11 PROCEDURE — 36415 COLL VENOUS BLD VENIPUNCTURE: CPT | Performed by: NURSE PRACTITIONER

## 2023-10-11 PROCEDURE — 1160F RVW MEDS BY RX/DR IN RCRD: CPT | Mod: CPTII,,, | Performed by: NURSE PRACTITIONER

## 2023-10-11 PROCEDURE — 3008F BODY MASS INDEX DOCD: CPT | Mod: CPTII,,, | Performed by: NURSE PRACTITIONER

## 2023-10-11 PROCEDURE — 83036 HEMOGLOBIN GLYCOSYLATED A1C: CPT | Performed by: NURSE PRACTITIONER

## 2023-10-11 PROCEDURE — 92228 IMG RTA DETC/MNTR DS PHY/QHP: CPT | Mod: PBBFAC

## 2023-10-11 PROCEDURE — 1159F MED LIST DOCD IN RCRD: CPT | Mod: CPTII,,, | Performed by: NURSE PRACTITIONER

## 2023-10-11 PROCEDURE — 3008F PR BODY MASS INDEX (BMI) DOCUMENTED: ICD-10-PCS | Mod: CPTII,,, | Performed by: NURSE PRACTITIONER

## 2023-10-11 PROCEDURE — 1111F DSCHRG MED/CURRENT MED MERGE: CPT | Mod: CPTII,,, | Performed by: NURSE PRACTITIONER

## 2023-10-11 PROCEDURE — 3074F PR MOST RECENT SYSTOLIC BLOOD PRESSURE < 130 MM HG: ICD-10-PCS | Mod: CPTII,,, | Performed by: NURSE PRACTITIONER

## 2023-10-11 PROCEDURE — 82043 UR ALBUMIN QUANTITATIVE: CPT | Performed by: NURSE PRACTITIONER

## 2023-10-11 PROCEDURE — 3078F PR MOST RECENT DIASTOLIC BLOOD PRESSURE < 80 MM HG: ICD-10-PCS | Mod: CPTII,,, | Performed by: NURSE PRACTITIONER

## 2023-10-11 PROCEDURE — 3074F SYST BP LT 130 MM HG: CPT | Mod: CPTII,,, | Performed by: NURSE PRACTITIONER

## 2023-10-11 PROCEDURE — 3078F DIAST BP <80 MM HG: CPT | Mod: CPTII,,, | Performed by: NURSE PRACTITIONER

## 2023-10-11 PROCEDURE — 99214 OFFICE O/P EST MOD 30 MIN: CPT | Mod: PBBFAC | Performed by: NURSE PRACTITIONER

## 2023-10-11 PROCEDURE — 1159F PR MEDICATION LIST DOCUMENTED IN MEDICAL RECORD: ICD-10-PCS | Mod: CPTII,,, | Performed by: NURSE PRACTITIONER

## 2023-10-11 RX ORDER — METFORMIN HYDROCHLORIDE 500 MG/1
500 TABLET ORAL 2 TIMES DAILY WITH MEALS
Qty: 180 TABLET | Refills: 3 | Status: SHIPPED | OUTPATIENT
Start: 2023-10-11 | End: 2023-11-08

## 2023-10-11 RX ORDER — INSULIN PUMP SYRINGE, 3 ML
EACH MISCELLANEOUS
Qty: 1 EACH | Refills: 0 | Status: SHIPPED | OUTPATIENT
Start: 2023-10-11 | End: 2023-10-13 | Stop reason: SDUPTHER

## 2023-10-11 RX ORDER — LANCETS
EACH MISCELLANEOUS
Qty: 200 EACH | Refills: 1 | Status: SHIPPED | OUTPATIENT
Start: 2023-10-11 | End: 2023-10-13

## 2023-10-11 RX ORDER — ZOLPIDEM TARTRATE 12.5 MG/1
12.5 TABLET, FILM COATED, EXTENDED RELEASE ORAL NIGHTLY PRN
Qty: 30 TABLET | Refills: 3 | Status: SHIPPED | OUTPATIENT
Start: 2023-10-11 | End: 2023-10-13 | Stop reason: SDUPTHER

## 2023-10-11 RX ORDER — MICONAZOLE NITRATE 2 %
POWDER (GRAM) TOPICAL 2 TIMES DAILY
Qty: 71 G | Refills: 1 | Status: SHIPPED | OUTPATIENT
Start: 2023-10-11 | End: 2023-10-25

## 2023-10-11 RX ORDER — ROSUVASTATIN CALCIUM 5 MG/1
5 TABLET, COATED ORAL NIGHTLY
Qty: 90 TABLET | Refills: 3 | Status: SHIPPED | OUTPATIENT
Start: 2023-10-11 | End: 2024-10-10

## 2023-10-11 NOTE — PATIENT INSTRUCTIONS
Jaspreet Mclaughlin,     If you are due for any health screening(s) below please notify me so we can arrange them to be ordered and scheduled. Most healthy patients at your age complete them, but you are free to accept or refuse.     If you can't do it, I'll definitely understand. If you can, I'd certainly appreciate it!    Tests to Keep You Healthy    Eye Exam: ORDERED BUT NOT SCHEDULED  Last HbA1c < 8 (The patient doesn't have any registry metric data available): NO      Your diabetic retinal eye exam is due     Diabetes is the #1 cause of blindness in the [place] - early detection before signs or symptoms develop can prevent debilitating blindness.     Our records indicate that you may be overdue for your annual diabetic eye exam. Eye screening can help identify patients at risk for developing vision loss which is common in diabetes. This simple screening is an important step to keeping you healthy and preventing complications from diabetes.     This recommended diabetic eye exam should take place once per year and can prevent and treat diabetes complications in the eye before developing symptoms. This can be done with a special camera is used to take photographs of the back of your eye without having to dilate them, or you can see an eye doctor for a full dilated exam.     If you recently had your yearly diabetic eye exam performed outside of [facility], please let your Health care team know so that they can update your health record.

## 2023-10-11 NOTE — ASSESSMENT & PLAN NOTE
Patient was offered trazodone, declined because it did not help in the past and also was sleepy for days after taking trazodone.  Does not remember trazodone dose.  Patient state he has been benefitting from Ambien 12.5 controlled release and would like to continue medication.  Patient state it makes him fall asleep easy and maintain sleep throughout the night.  Rx Ambien 12.5 controlled release sent to preferred pharmacy and to take as directed.  Precaution Discussed.  Questions solicited and answered, patient verbalized and agreed to plan.  Regarding insomnia medication it has been rerouted to [facility] since the patient having difficulty filling his medication at [facility].  Questions solicited answered, patient verbalized.  Keep follow-up appointment as directed.  Patient verbalized.

## 2023-10-11 NOTE — ASSESSMENT & PLAN NOTE
PHQ-9 score is 13, josé miguel-7 score is 12.  Patient state he has been on Zoloft 100 mg p.o. once daily.  Patient state he feels that is not working like it should be.  Discussed with patient to continue with same dose and [facility] and [facility].  Patient agreed to plan and verbalized understanding.  Patient to read discharge education materials.

## 2023-10-11 NOTE — PROGRESS NOTES
Arnoldo Gonzalez Royce Henderson is a 43 y.o. male here for a diabetic eye screening with non-dilated fundus photos per Rowena Treviño .    Patient cooperative?: Yes  Small pupils?: No  Last eye exam: Unknown    For exam results, see Encounter Report.

## 2023-10-11 NOTE — PROGRESS NOTES
Patient Name: Arnoldo Crane Jr.   : 1980  MRN: [id number]     SUBJECTIVE DATA:    CHIEF COMPLAINT:   Arnoldo Crane Jr. is a 43 y.o. male who presents to clinic today with Diabetes        HPI:  43-year-old male presents to the clinic to follow-up on diabetes, depression with anxiety and insomnia.    Diabetes:  Patient state he was given a prednisone to manage lower back strain and that is when he started feeling bad.  To [facility] emergency department and his blood sugar were elevated.  Patient was diagnosed with DKA and was admitted to the hospital for management but he ended up leaving against medical advice.  Patient is here today to manage diabetes and to start on medications.  Discussed with patient will initiate hemoglobin A1c, urine microalbumin, foot exam, eye exam.  Also discussed Rx metformin 500 mg p.o. b.i.d. as a start and to check his sugars twice a day.  Discussed with patient will results of hemoglobin A1c becomes available will adjust and add medications as needed.  Patient to return to clinic in 4 weeks for follow-up.  Patient to read discharge education materials.  Questions solicited and answered, patient verbalized and agreed to plan.  10/13/2023 addendum:  Hemoglobin A1c 11.3%.  Spoke to patient over the phone, added 2nd medicine Rx glipizide 5 mg extended release p.o. once daily with breakfast.  Also recent glucometer equipment to [facility] pharmacy since he is having difficulties getting filled at [facility].  Regarding insomnia medication it has been rerouted to [facility] since the patient having difficulty filling his medication at [facility].  Questions solicited answered, patient verbalized.  Keep follow-up appointment as directed.  Patient verbalized.    Anxiety and depression:  PHQ-9 score is 13, josé miguel-7 score is 12.  Patient state he has been on Zoloft 100 mg p.o. once daily.  Patient state he feels that is not working like  it should be.  Discussed with patient to continue with same dose and [facility] and management.  Patient agreed to plan and verbalized understanding.  Patient to read discharge education materials.    Insomnia:  Patient was offered trazodone, declined because it did not help in the past and also was sleepy for days after taking trazodone.  Does not remember trazodone dose.  Patient state he has been benefitting from Ambien 12.5 controlled release and would like to continue medication.  Patient state it makes him fall asleep easy and maintain sleep throughout the night.  Rx Ambien 12.5 controlled release sent to preferred pharmacy and to take as directed.  Precaution Discussed.  Questions solicited and answered, patient verbalized and agreed to plan.    Statin management:  The 10-year ASCVD risk score (Augusto DIALLO, et al., 2019) is: 4%    Values used to calculate the score:      Age: 43 years      Sex: Male      Is Non- : No      Diabetic: Yes      Tobacco smoker: No      Systolic Blood Pressure: 105 mmHg      Is BP treated: No      HDL Cholesterol: 22 mg/dL      Total Cholesterol: 172 mg/dL   Discussed with patient will initiate a low-dose statin Crestor 5 mg p.o. once at bedtime daily.  Follow low-cholesterol, low-fat diet.  Questions solicited and answered, patient verbalized and agreed to plan.    Tinea pedis:  Discussed with patient due to uncontrolled diabetes and foot moisturizer it increase risk to develop fungal infection.  Discussed Rx Zeasorb topical powder twice a day 14 days and then as needed.  Also to make sure to be applied between toe webs.  Change socks frequently if needed to maintain low moisture.  Questions solicited and answered, patient verbalized and agreed to plan.    Patient denies chest pain, shortness of breath, dyspnea on exertion, palpitations, peripheral edema, abdominal pain, nausea, vomiting, diarrhea, constipation,  fatigue, fever, chills, dysuria,  hematuria, melena, or hematochezia.         ALLERGIES: Review of patient's allergies indicates:  No Known Allergies      ROS:  Review of Systems   Constitutional:         Morbid obese.  Diabetes.   Skin:  Positive for itching and rash.   Psychiatric/Behavioral:  Positive for depression. The patient is nervous/anxious and has insomnia.    All other systems reviewed and are negative.        OBJECTIVE DATA:  Vital signs  Vitals:    10/11/23 1253   BP: 105/72   BP Location: Right arm   Patient Position: Sitting   BP Method: Large (Automatic)   Pulse: 86   Temp: 98.2 °F (36.8 °C)   TempSrc: Oral   SpO2: 95%   Weight: (!) 145.3 kg (320 lb 6.4 oz)   Height: 6' (1.829 m)      Body mass index is 43.45 kg/m².    PHYSICAL EXAM:   Physical Exam  Vitals and nursing note reviewed.   Constitutional:       General: He is awake. He is not in acute distress.     Appearance: Normal appearance. He is well-developed. He is morbidly obese. He is not ill-appearing, toxic-appearing or diaphoretic.   HENT:      Head: Normocephalic and atraumatic.      Right Ear: Ear canal and external ear normal. Tympanic membrane is scarred (Chronic from multiple ear infections and tubes as a child.).      Left Ear: Ear canal and external ear normal. Tympanic membrane is scarred (Chronic for multiple ear infections and tubes as a child.) and perforated (Chronic).      Nose: Nose normal.      Mouth/Throat:      Lips: Pink.      Mouth: Mucous membranes are moist.      Tongue: No lesions. Tongue does not deviate from midline.      Palate: No mass and lesions.      Pharynx: Oropharynx is clear. Uvula midline.   Eyes:      General: Lids are normal.      Extraocular Movements: Extraocular movements intact.      Conjunctiva/sclera: Conjunctivae normal.      Pupils: Pupils are equal, round, and reactive to light.   Neck:      Trachea: Trachea and phonation normal.   Cardiovascular:      Rate and Rhythm: Normal rate and regular  rhythm.      Pulses: Normal pulses.           Carotid pulses are 2+ on the right side and 2+ on the left side.       Radial pulses are 2+ on the right side and 2+ on the left side.        Dorsalis pedis pulses are 2+ on the right side and 2+ on the left side.        Posterior tibial pulses are 2+ on the right side and 2+ on the left side.      Heart sounds: Normal heart sounds. No murmur heard.  Pulmonary:      Effort: Pulmonary effort is normal.      Breath sounds: Normal breath sounds and air entry. No wheezing or rhonchi.   Abdominal:      Palpations: Abdomen is soft.      Tenderness: There is no abdominal tenderness. There is no right CVA tenderness or left CVA tenderness.   Musculoskeletal:         General: Normal range of motion.      Cervical back: Normal range of motion and neck supple. No rigidity or tenderness.      Right lower leg: No edema.      Left lower leg: No edema.      Right foot: Normal range of motion. No deformity, bunion, Charcot foot, foot drop or prominent metatarsal heads.      Left foot: Normal range of motion. No deformity, bunion, Charcot foot, foot drop or prominent metatarsal heads.   Feet:      Right foot:      Protective Sensation: 10 sites tested.  10 sites sensed.      Skin integrity: Skin breakdown and erythema present.      Toenail Condition: Fungal disease present.     Left foot:      Protective Sensation: 10 sites tested.  10 sites sensed.      Skin integrity: Skin breakdown and erythema present.      Toenail Condition: Fungal disease present.     Comments: Patient wearing appropriate footwear.  Inspect feet daily if possible.  Tinea pedis noted.  Apply medication as directed.  Lymphadenopathy:      Cervical: No cervical adenopathy.   Skin:     General: Skin is warm.      Capillary Refill: Capillary refill takes less than 2 seconds.      Findings: Rash present.   Neurological:      General: No focal deficit present.      Mental Status: He is alert and oriented to person, place,  and time. Mental status is at baseline.      GCS: GCS eye subscore is 4. GCS verbal subscore is 5. GCS motor subscore is 6.      Cranial Nerves: No cranial nerve deficit.      Sensory: No sensory deficit.      Motor: No weakness.      Coordination: Coordination normal.      Gait: Gait normal.   Psychiatric:         Attention and Perception: Attention and perception normal.         Mood and Affect: Mood and affect normal.         Speech: Speech normal.         Behavior: Behavior normal. Behavior is cooperative.         Thought Content: Thought content normal.         Cognition and Memory: Cognition and memory normal.         Judgment: Judgment normal.          ASSESSMENT/PLAN:  1. Type 2 diabetes mellitus with hyperglycemia, without long-term current use of insulin  Assessment & Plan:  Patient state he was given a prednisone to manage lower back strain and that is when he started feeling bad.  To [facility] emergency department and his blood sugar were elevated.  Patient was diagnosed with DKA and was admitted to the hospital for management but he ended up leaving against medical advice.  Patient is here today to manage diabetes and to start on medications.  Discussed with patient will initiate hemoglobin A1c, urine microalbumin, foot exam, eye exam.  Also discussed Rx metformin 500 mg p.o. b.i.d. as a start and to check his sugars twice a day.  Discussed with patient will results of hemoglobin A1c becomes available will adjust and add medications as needed.  Patient to return to clinic in 4 weeks for follow-up.  Patient to read discharge education materials.  Questions solicited and answered, patient verbalized and agreed to plan.  Hemoglobin A1c 11.3%.  Spoke to patient over the phone, added 2nd medicine Rx glipizide 5 mg extended release p.o. once daily with breakfast.  Also recent glucometer equipment to [facility] pharmacy since he is having difficulties getting filled at  [facility].  Regarding insomnia medication it has been rerouted to [facility] since the patient having difficulty filling his medication at [facility].  Questions solicited answered, patient verbalized.  Keep follow-up appointment as directed.  Patient verbalized.    Orders:  -     C-Peptide  -     Hemoglobin A1C  -     Microalbumin/Creatinine Ratio, Urine  -     Comprehensive Metabolic Panel  -     Diabetic Eye Screening Photo; Future; Expected date: 10/11/2023  -     Foot Exam Performed  -     metFORMIN (GLUCOPHAGE) 500 MG tablet; Take 1 tablet (500 mg total) by mouth 2 (two) times daily with meals.  Dispense: 180 tablet; Refill: 3  -     rosuvastatin (CRESTOR) 5 MG tablet; Take 1 tablet (5 mg total) by mouth every evening.  Dispense: 90 tablet; Refill: 3  -     Discontinue: blood-glucose meter kit; To check BG 2 times daily, to use with insurance preferred meter  Dispense: 1 each; Refill: 0  -     Discontinue: lancets Misc; To check BG 2 times daily, to use with insurance preferred meter  Dispense: 200 each; Refill: 1  -     Discontinue: blood sugar diagnostic Strp; To check BG 2 times daily, to use with insurance preferred meter  Dispense: 200 each; Refill: 1  -     glipiZIDE 5 MG TR24; Take 1 tablet (5 mg total) by mouth daily with breakfast.  Dispense: 90 tablet; Refill: 0  -     lancets Misc; To check BG 2 times daily, to use with insurance preferred meter  Dispense: 200 each; Refill: 1  -     blood-glucose meter kit; To check BG 2 times daily, to use with insurance preferred meter  Dispense: 1 each; Refill: 0  -     blood sugar diagnostic Strp; To check BG 2 times daily, to use with insurance preferred meter  Dispense: 200 each; Refill: 1    2. Anxiety and depression  Assessment & Plan:  PHQ-9 score is 13, josé miguel-7 score is 12.  Patient state he has been on Zoloft 100 mg p.o. once daily.  Patient state he feels that is not working like it should be.  Discussed with patient to continue with same dose  and will [facility] and management.  Patient agreed to plan and verbalized understanding.  Patient to read discharge education materials.      3. Positive depression screening  Comments:  I have reviewed the positive depression score which warrants active treatment with psychotherapy and/or medications.  Overview:  I have reviewed the positive depression score which warrants active treatment with psychotherapy and/or medications.    Orders:  -     Ambulatory referral/consult to Psychiatry; Future; Expected date: 10/18/2023    4. Psychophysiological insomnia  Assessment & Plan:   Patient was offered trazodone, declined because it did not help in the past and also was sleepy for days after taking trazodone.  Does not remember trazodone dose.  Patient state he has been benefitting from Ambien 12.5 controlled release and would like to continue medication.  Patient state it makes him fall asleep easy and maintain sleep throughout the night.  Rx Ambien 12.5 controlled release sent to preferred pharmacy and to take as directed.  Precaution Discussed.  Questions solicited and answered, patient verbalized and agreed to plan.  Regarding insomnia medication it has been rerouted to [facility] since the patient having difficulty filling his medication at [facility].  Questions solicited answered, patient verbalized.  Keep follow-up appointment as directed.  Patient verbalized.    Orders:  -     Discontinue: zolpidem (AMBIEN CR) 12.5 MG CR tablet; Take 1 tablet (12.5 mg total) by mouth nightly as needed for Insomnia.  Dispense: 30 tablet; Refill: 3  -     Ambulatory referral/consult to Psychiatry; Future; Expected date: 10/18/2023  -     zolpidem (AMBIEN CR) 12.5 MG CR tablet; Take 1 tablet (12.5 mg total) by mouth nightly as needed for Insomnia.  Dispense: 30 tablet; Refill: 3    5. Tinea pedis of both feet  Assessment & Plan:   Discussed with patient due to uncontrolled  diabetes and foot moisturizer it increase risk to develop fungal infection.  Discussed Rx Zeasorb topical powder twice a day 14 days and then as needed.  Also to make sure to be applied between toe webs.  Change socks frequently if needed to maintain low moisture.  Questions solicited and answered, patient verbalized and agreed to plan.    Orders:  -     miconazole NITRATE 2 % (ZEASORB AF) 2 % top powder; Apply topically 2 (two) times daily. And then as needed.  Make sure to apply between toe webs. for 14 days  Dispense: 71 g; Refill: 1           RESULTS:  Recent Results (from the past 1008 hour(s))   Basic metabolic panel    Collection Time: 09/29/23  6:20 AM   Result Value Ref Range    Sodium Level 133 (L) 136 - 145 mmol/L    Potassium Level 4.3 3.5 - 5.1 mmol/L    Chloride 97 (L) 98 - 107 mmol/L    Carbon Dioxide 15 (L) 22 - 29 mmol/L    Glucose Level 575 (HH) 74 - 100 mg/dL    Blood Urea Nitrogen 7.3 (L) 8.9 - 20.6 mg/dL    Creatinine 1.30 (H) 0.73 - 1.18 mg/dL    BUN/Creatinine Ratio 6     Calcium Level Total 9.2 8.4 - 10.2 mg/dL    Anion Gap 21.0 mEq/L    eGFR >60 mls/min/1.73/m2   CBC with Differential    Collection Time: 09/29/23  6:20 AM   Result Value Ref Range    WBC 6.05 4.50 - 11.50 x10(3)/mcL    RBC 5.16 4.70 - 6.10 x10(6)/mcL    Hgb 14.9 14.0 - 18.0 g/dL    Hct 42.8 42.0 - 52.0 %    MCV 82.9 80.0 - 94.0 fL    MCH 28.9 27.0 - 31.0 pg    MCHC 34.8 33.0 - 36.0 g/dL    RDW 12.8 11.5 - 17.0 %    Platelet 219 130 - 400 x10(3)/mcL    MPV 11.6 (H) 7.4 - 10.4 fL    Neut % 47.6 %    Lymph % 36.4 %    Mono % 12.4 %    Eos % 2.0 %    Basophil % 0.8 %    Lymph # 2.20 0.6 - 4.6 x10(3)/mcL    Neut # 2.88 2.1 - 9.2 x10(3)/mcL    Mono # 0.75 0.1 - 1.3 x10(3)/mcL    Eos # 0.12 0 - 0.9 x10(3)/mcL    Baso # 0.05 <=0.2 x10(3)/mcL    IG# 0.05 (H) 0 - 0.04 x10(3)/mcL    IG% 0.8 %    NRBC% 0.0 %   Beta-Hydroxybutyrate, Serum    Collection Time: 09/29/23  6:20 AM   Result Value Ref Range    Beta Hydroxybutyrate 4.70 (H)  <=0.30 mmol/L   Lipid Panel    Collection Time: 09/29/23  6:20 AM   Result Value Ref Range    Cholesterol Total 172 <=200 mg/dL    HDL Cholesterol 22 (L) 35 - 60 mg/dL    Triglyceride 1,013 (H) 34 - 140 mg/dL    Cholesterol/HDL Ratio 8 (H) 0 - 5    Very Low Density Lipoprotein 203     LDL Cholesterol -53.00 (L) 50.00 - 140.00 mg/dL   TSH    Collection Time: 09/29/23  6:20 AM   Result Value Ref Range    TSH 6.657 (H) 0.350 - 4.940 uIU/mL   T4, Free    Collection Time: 09/29/23  6:20 AM   Result Value Ref Range    Thyroxine Free 1.05 0.70 - 1.48 ng/dL   Lipase    Collection Time: 09/29/23  6:20 AM   Result Value Ref Range    Lipase Level 20 <=60 U/L   Amylase    Collection Time: 09/29/23  6:20 AM   Result Value Ref Range    Amylase Level 36 25 - 125 unit/L   Urinalysis, Reflex to Urine Culture    Collection Time: 09/29/23  6:26 AM    Specimen: Urine   Result Value Ref Range    Color, UA Colorless (A) Yellow, Light-Yellow, Dark Yellow, Yuly, Straw    Appearance, UA Clear Clear    Specific Gravity, UA 1.034 (H) 1.005 - 1.030    pH, UA 5.0 5.0 - 8.5    Protein, UA Negative Negative    Glucose, UA 4+ (A) Negative, Normal    Ketones, UA 3+ (A) Negative    Blood, UA Negative Negative    Bilirubin, UA Negative Negative    Urobilinogen, UA Normal 0.2, 1.0, Normal    Nitrites, UA Negative Negative    Leukocyte Esterase, UA Negative Negative    WBC, UA 0-5 None Seen, 0-2, 3-5, 0-5 /HPF    Bacteria, UA None Seen None Seen /HPF    Squamous Epithelial Cells, UA None Seen None Seen /HPF    Mucous, UA Trace (A) None Seen /LPF    Hyaline Casts, UA None Seen None Seen /lpf    RBC, UA 0-5 None Seen, 0-2, 3-5, 0-5 /HPF   Brain natriuretic peptide    Collection Time: 09/29/23  7:29 AM   Result Value Ref Range    Natriuretic Peptide <10.0 <=100.0 pg/mL   POCT glucose    Collection Time: 09/29/23  7:49 AM   Result Value Ref Range    POCT Glucose >500 (HH) 70 - 110 mg/dL   RT Blood Gas    Collection Time: 09/29/23  8:59 AM   Result Value  Ref Range    Sample Type Venous Blood     Drawn by lab     pH, Blood gas 7.320 7.300 - 7.400    pCO2, Blood gas 50.0 40.0 - 50.0 mmHg    pO2, Blood gas 34.0 30.0 - 40.0 mmHg    TOC2, Blood gas 27.3 mmol/L    Base Excess, Blood gas -1.00 mmol/L    sO2, Blood gas 56.3 %    HCO3, Blood gas 25.8 mmol/L    THb, Blood gas 13.9 g/dL    O2 Hb, Blood Gas 54.8 %    CO Hgb 1.8 %    Met Hgb 0.9 %    Allens Test N/A    POCT glucose    Collection Time: 09/29/23  9:12 AM   Result Value Ref Range    POCT Glucose 314 (H) 70 - 110 mg/dL   POCT glucose    Collection Time: 09/29/23 10:02 AM   Result Value Ref Range    POCT Glucose 242 (H) 70 - 110 mg/dL   Drug Screen, Urine    Collection Time: 09/29/23 10:37 AM   Result Value Ref Range    Amphetamines, Urine Negative Negative    Barbituates, Urine Negative Negative    Benzodiazepine, Urine Negative Negative    Cannabinoids, Urine Negative Negative    Cocaine, Urine Negative Negative    Fentanyl, Urine Negative Negative    MDMA, Urine Negative Negative    Opiates, Urine Negative Negative    Phencyclidine, Urine Negative Negative    pH, Urine 5.5 3.0 - 11.0   POCT glucose    Collection Time: 09/29/23 11:17 AM   Result Value Ref Range    POCT Glucose 146 (H) 70 - 110 mg/dL   Basic metabolic panel    Collection Time: 09/29/23 12:17 PM   Result Value Ref Range    Sodium Level 140 136 - 145 mmol/L    Potassium Level 4.1 3.5 - 5.1 mmol/L    Chloride 107 98 - 107 mmol/L    Carbon Dioxide 15 (L) 22 - 29 mmol/L    Glucose Level 140 (H) 74 - 100 mg/dL    Blood Urea Nitrogen 10.9 8.9 - 20.6 mg/dL    Creatinine 0.93 0.73 - 1.18 mg/dL    BUN/Creatinine Ratio 12     Calcium Level Total 9.2 8.4 - 10.2 mg/dL    Anion Gap 18.0 mEq/L    eGFR >60 mls/min/1.73/m2   POCT glucose    Collection Time: 09/29/23  1:46 PM   Result Value Ref Range    POCT Glucose 161 (H) 70 - 110 mg/dL   POCT glucose    Collection Time: 09/29/23  2:58 PM   Result Value Ref Range    POCT Glucose 150 (H) 70 - 110 mg/dL   POCT  glucose    Collection Time: 09/29/23  4:05 PM   Result Value Ref Range    POCT Glucose 142 (H) 70 - 110 mg/dL   Basic metabolic panel    Collection Time: 09/29/23  4:11 PM   Result Value Ref Range    Sodium Level 139 136 - 145 mmol/L    Potassium Level 3.6 3.5 - 5.1 mmol/L    Chloride 106 98 - 107 mmol/L    Carbon Dioxide 20 (L) 22 - 29 mmol/L    Glucose Level 150 (H) 74 - 100 mg/dL    Blood Urea Nitrogen 9.9 8.9 - 20.6 mg/dL    Creatinine 0.91 0.73 - 1.18 mg/dL    BUN/Creatinine Ratio 11     Calcium Level Total 9.1 8.4 - 10.2 mg/dL    Anion Gap 13.0 mEq/L    eGFR >60 mls/min/1.73/m2   POCT glucose    Collection Time: 09/29/23  5:01 PM   Result Value Ref Range    POCT Glucose 164 (H) 70 - 110 mg/dL   POCT glucose    Collection Time: 09/29/23  6:03 PM   Result Value Ref Range    POCT Glucose 142 (H) 70 - 110 mg/dL   POCT glucose    Collection Time: 09/29/23  7:08 PM   Result Value Ref Range    POCT Glucose 144 (H) 70 - 110 mg/dL   Hemoglobin A1C    Collection Time: 10/11/23  1:19 PM   Result Value Ref Range    Hemoglobin A1c 11.3 (H) <=7.0 %    Estimated Average Glucose 277.6 mg/dL   Microalbumin/Creatinine Ratio, Urine    Collection Time: 10/11/23  1:19 PM   Result Value Ref Range    Urine Microalbumin 22.9 <=30.0 ug/ml    Urine Creatinine 73.4 63.0 - 166.0 mg/dL    Microalbumin Creatinine Ratio 31.2 (H) 0.0 - 30.0 mg/gm Cr   Comprehensive Metabolic Panel    Collection Time: 10/11/23  1:19 PM   Result Value Ref Range    Sodium Level 133 (L) 136 - 145 mmol/L    Potassium Level 4.5 3.5 - 5.1 mmol/L    Chloride 98 98 - 107 mmol/L    Carbon Dioxide 18 (L) 22 - 29 mmol/L    Glucose Level 366 (H) 74 - 100 mg/dL    Blood Urea Nitrogen 13.0 8.9 - 20.6 mg/dL    Creatinine 1.04 0.73 - 1.18 mg/dL    Calcium Level Total 10.1 8.4 - 10.2 mg/dL    Protein Total 9.6 (H) 6.4 - 8.3 gm/dL    Albumin Level 4.2 3.5 - 5.0 g/dL    Globulin 5.4 (H) 2.4 - 3.5 gm/dL    Albumin/Globulin Ratio 0.8 (L) 1.1 - 2.0 ratio    Bilirubin Total 0.7  <=1.5 mg/dL    Alkaline Phosphatase 73 40 - 150 unit/L    Alanine Aminotransferase 35 0 - 55 unit/L    Aspartate Aminotransferase 19 5 - 34 unit/L    eGFR >60 mls/min/1.73/m2         Follow Up:  Follow up in about 4 weeks (around 11/8/2023).     Face to face time 45 minutes, including documentation, chart review, counseling, education, review of test results, relevant medical records, and coordination of care.   I have explained the treatment plan, diagnosis, and prognosis to patient. All questions are answered to the best of my knowledge.     Previous medical history/lab work/radiology reviewed and considered during medical management decisions.   Medication list reviewed and medication reconciliation performed.  Patient was provided  and care about his/her current diagnosis (es) and medications including risk/benefit and side effects/adverse events, over the counter medication uses/doses, home self-care and contact precautions,  and red flags and indications for when to seek immediate medical attention.   Patient was advised to continue compliance with current medication list and medical recommendations.  Patient dvised continued compliance with recommended eating habits/ diets for medical conditions and exercise 150 minutes/ week (if possible) for medical condition (s).  Educational handouts and instructions on selected disease management in AVS (After Visit Summary).    All of the patient's questions were answered to patient's satisfaction.   The patient was receptive, expressed verbal understanding and agreement the above plan.        This note was created with the assistance of a voice recognition software or phone dictation. There may be transcription errors as a result of using this technology however minimal. Effort has been made to assure accuracy of transcription but any obvious errors or omissions should be clarified with the author of the document  I have used clinical judgement based on duration  and functional

## 2023-10-11 NOTE — ASSESSMENT & PLAN NOTE
Discussed with patient due to uncontrolled diabetes and foot moisturizer it increase risk to develop fungal infection.  Discussed Rx Zeasorb topical powder twice a day 14 days and then as needed.  Also to make sure to be applied between toe webs.  Change socks frequently if needed to maintain low moisture.  Questions solicited and answered, patient verbalized and agreed to plan.

## 2023-10-11 NOTE — ASSESSMENT & PLAN NOTE
Patient state he was given a prednisone to manage lower back strain and that is when he started feeling bad.  To [facility] emergency department and his blood sugar were elevated.  Patient was diagnosed with DKA and was admitted to the hospital for management but he ended up leaving against medical advice.  Patient is here today to manage diabetes and to start on medications.  Discussed with patient will initiate hemoglobin A1c, urine microalbumin, foot exam, eye exam.  Also discussed Rx metformin 500 mg p.o. b.i.d. as a start and to check his sugars twice a day.  Discussed with patient will results of hemoglobin A1c becomes available will adjust and add medications as needed.  Patient to return to clinic in 4 weeks for follow-up.  Patient to read discharge education materials.  Questions solicited and answered, patient verbalized and agreed to plan.  Hemoglobin A1c 11.3%.  Spoke to patient over the phone, added 2nd medicine Rx glipizide 5 mg extended release p.o. once daily with breakfast.  Also recent glucometer equipment to [facility] pharmacy since he is having difficulties getting filled at [facility].  Regarding insomnia medication it has been rerouted to [facility] since the patient having difficulty filling his medication at [facility].  Questions solicited answered, patient verbalized.  Keep follow-up appointment as directed.  Patient verbalized.

## 2023-10-12 ENCOUNTER — TELEPHONE (OUTPATIENT)
Dept: INTERNAL MEDICINE | Facility: CLINIC | Age: 43
End: 2023-10-12
Payer: MEDICAID

## 2023-10-12 NOTE — PROGRESS NOTES
Please notify patient regarding diabetic eye screening photo.  No retinopathy noted.  Will continue to monitor yearly.

## 2023-10-12 NOTE — TELEPHONE ENCOUNTER
Notified patient regarding diabetic eye screening photo.  No retinopathy noted.  Will continue to monitor yearly.  Patient voiced understanding.

## 2023-10-13 RX ORDER — GLIPIZIDE 5 MG/1
5 TABLET, FILM COATED, EXTENDED RELEASE ORAL
Qty: 90 TABLET | Refills: 0 | Status: SHIPPED | OUTPATIENT
Start: 2023-10-13 | End: 2023-11-08

## 2023-10-13 RX ORDER — ZOLPIDEM TARTRATE 12.5 MG/1
12.5 TABLET, FILM COATED, EXTENDED RELEASE ORAL NIGHTLY PRN
Qty: 30 TABLET | Refills: 3 | Status: SHIPPED | OUTPATIENT
Start: 2023-10-13 | End: 2023-10-18 | Stop reason: SDUPTHER

## 2023-10-13 RX ORDER — LANCETS
EACH MISCELLANEOUS
Qty: 200 EACH | Refills: 1 | Status: SHIPPED | OUTPATIENT
Start: 2023-10-13

## 2023-10-13 RX ORDER — INSULIN PUMP SYRINGE, 3 ML
EACH MISCELLANEOUS
Qty: 1 EACH | Refills: 0 | Status: SHIPPED | OUTPATIENT
Start: 2023-10-13 | End: 2024-10-12

## 2023-10-14 LAB — C PEPTIDE P FAST SERPL-MCNC: 6.2 NG/ML (ref 1.1–4.4)

## 2023-10-18 ENCOUNTER — OFFICE VISIT (OUTPATIENT)
Dept: BEHAVIORAL HEALTH | Facility: CLINIC | Age: 43
End: 2023-10-18
Payer: MEDICAID

## 2023-10-18 VITALS
HEART RATE: 80 BPM | HEIGHT: 72 IN | BODY MASS INDEX: 42.48 KG/M2 | WEIGHT: 313.63 LBS | DIASTOLIC BLOOD PRESSURE: 87 MMHG | TEMPERATURE: 98 F | SYSTOLIC BLOOD PRESSURE: 117 MMHG

## 2023-10-18 DIAGNOSIS — F41.1 GAD (GENERALIZED ANXIETY DISORDER): Chronic | ICD-10-CM

## 2023-10-18 DIAGNOSIS — F51.04 PSYCHOPHYSIOLOGICAL INSOMNIA: ICD-10-CM

## 2023-10-18 DIAGNOSIS — F33.1 MODERATE EPISODE OF RECURRENT MAJOR DEPRESSIVE DISORDER: Chronic | ICD-10-CM

## 2023-10-18 DIAGNOSIS — Z13.31 POSITIVE DEPRESSION SCREENING: ICD-10-CM

## 2023-10-18 PROCEDURE — 1111F PR DISCHARGE MEDS RECONCILED W/ CURRENT OUTPATIENT MED LIST: ICD-10-PCS | Mod: CPTII,,, | Performed by: NURSE PRACTITIONER

## 2023-10-18 PROCEDURE — 3074F SYST BP LT 130 MM HG: CPT | Mod: CPTII,,, | Performed by: NURSE PRACTITIONER

## 2023-10-18 PROCEDURE — 3060F PR POS MICROALBUMINURIA RESULT DOCUMENTED/REVIEW: ICD-10-PCS | Mod: CPTII,,, | Performed by: NURSE PRACTITIONER

## 2023-10-18 PROCEDURE — 1160F RVW MEDS BY RX/DR IN RCRD: CPT | Mod: CPTII,,, | Performed by: NURSE PRACTITIONER

## 2023-10-18 PROCEDURE — 99215 PR OFFICE/OUTPT VISIT, EST, LEVL V, 40-54 MIN: ICD-10-PCS | Mod: S$PBB,,, | Performed by: NURSE PRACTITIONER

## 2023-10-18 PROCEDURE — 1111F DSCHRG MED/CURRENT MED MERGE: CPT | Mod: CPTII,,, | Performed by: NURSE PRACTITIONER

## 2023-10-18 PROCEDURE — 3046F PR MOST RECENT HEMOGLOBIN A1C LEVEL > 9.0%: ICD-10-PCS | Mod: CPTII,,, | Performed by: NURSE PRACTITIONER

## 2023-10-18 PROCEDURE — 3060F POS MICROALBUMINURIA REV: CPT | Mod: CPTII,,, | Performed by: NURSE PRACTITIONER

## 2023-10-18 PROCEDURE — 99215 OFFICE O/P EST HI 40 MIN: CPT | Mod: S$PBB,,, | Performed by: NURSE PRACTITIONER

## 2023-10-18 PROCEDURE — 3008F BODY MASS INDEX DOCD: CPT | Mod: CPTII,,, | Performed by: NURSE PRACTITIONER

## 2023-10-18 PROCEDURE — 3008F PR BODY MASS INDEX (BMI) DOCUMENTED: ICD-10-PCS | Mod: CPTII,,, | Performed by: NURSE PRACTITIONER

## 2023-10-18 PROCEDURE — 3066F PR DOCUMENTATION OF TREATMENT FOR NEPHROPATHY: ICD-10-PCS | Mod: CPTII,,, | Performed by: NURSE PRACTITIONER

## 2023-10-18 PROCEDURE — 3079F PR MOST RECENT DIASTOLIC BLOOD PRESSURE 80-89 MM HG: ICD-10-PCS | Mod: CPTII,,, | Performed by: NURSE PRACTITIONER

## 2023-10-18 PROCEDURE — 3074F PR MOST RECENT SYSTOLIC BLOOD PRESSURE < 130 MM HG: ICD-10-PCS | Mod: CPTII,,, | Performed by: NURSE PRACTITIONER

## 2023-10-18 PROCEDURE — 1159F MED LIST DOCD IN RCRD: CPT | Mod: CPTII,,, | Performed by: NURSE PRACTITIONER

## 2023-10-18 PROCEDURE — 99214 OFFICE O/P EST MOD 30 MIN: CPT | Mod: PBBFAC,PN | Performed by: NURSE PRACTITIONER

## 2023-10-18 PROCEDURE — 1160F PR REVIEW ALL MEDS BY PRESCRIBER/CLIN PHARMACIST DOCUMENTED: ICD-10-PCS | Mod: CPTII,,, | Performed by: NURSE PRACTITIONER

## 2023-10-18 PROCEDURE — 1159F PR MEDICATION LIST DOCUMENTED IN MEDICAL RECORD: ICD-10-PCS | Mod: CPTII,,, | Performed by: NURSE PRACTITIONER

## 2023-10-18 PROCEDURE — 3079F DIAST BP 80-89 MM HG: CPT | Mod: CPTII,,, | Performed by: NURSE PRACTITIONER

## 2023-10-18 PROCEDURE — 3066F NEPHROPATHY DOC TX: CPT | Mod: CPTII,,, | Performed by: NURSE PRACTITIONER

## 2023-10-18 PROCEDURE — 3046F HEMOGLOBIN A1C LEVEL >9.0%: CPT | Mod: CPTII,,, | Performed by: NURSE PRACTITIONER

## 2023-10-18 RX ORDER — BUSPIRONE HYDROCHLORIDE 5 MG/1
5 TABLET ORAL 3 TIMES DAILY
Qty: 60 TABLET | Refills: 3 | Status: SHIPPED | OUTPATIENT
Start: 2023-10-18 | End: 2023-12-20 | Stop reason: SDUPTHER

## 2023-10-18 RX ORDER — DULOXETIN HYDROCHLORIDE 30 MG/1
30 CAPSULE, DELAYED RELEASE ORAL DAILY
Qty: 30 CAPSULE | Refills: 3 | Status: SHIPPED | OUTPATIENT
Start: 2023-10-18 | End: 2023-12-20 | Stop reason: DRUGHIGH

## 2023-10-18 RX ORDER — ZOLPIDEM TARTRATE 12.5 MG/1
12.5 TABLET, FILM COATED, EXTENDED RELEASE ORAL NIGHTLY PRN
Qty: 30 TABLET | Refills: 3 | Status: SHIPPED | OUTPATIENT
Start: 2023-10-18 | End: 2023-12-20 | Stop reason: SDUPTHER

## 2023-10-18 NOTE — PATIENT INSTRUCTIONS
Discontinue Zoloft, taper down to 50 mg x 2 weeks, then discontinue  2. Start Cymbalta 30 mg daily while tapering Zoloft   3. Start Buspar 5mg BID or TID  4. Follow-up in 8 weeks

## 2023-10-18 NOTE — PROGRESS NOTES
"Initial Interview  10/18/2023  HPI: Arnoldo Crane Jr. is a 43 y.o. male here today for a psychiatric evaluation referred by PCP to the [facility] for anxiety and depression  Past Medical History: Anxiety disorder, unspecified, Depression, Insomnia, Morbidly obese, Type 2 diabetes mellitus without complications       Patient states, " I have a lot of social anxiety, for the past couple of years. Hard to go, for instance, to the grocery store, I will sit in the parking lot for 10-15 minutes before going in. The same thing happens going to friends for cookouts, will go, and then my mood changes and then I am ready to go."    Unsure of any triggers for social anxiety.     Recently, 6 weeks ago, got hurt at work, went to urgent care for treatment, gave shot and back brace (Monday), then 4 days later, went back, received 2 shots and xray, sent home. The next Monday, went to hospital due to increase in pain and weakness, confusion. Found to be in diabetic ketoacidosis.  was hospitalized and placed on insulin drip, but signed himself out the next day AMA.     Works as a , went to  something and felt rip across  back, couldn't hardly move. That day went to the urgent care.  trying to get an MRI pushed through. Unsure if salary will be paid. Knows it will be a long process. Using ice therapy for pain,  does not like pain medication.     Currently taking Zoloft 100 mg daily, has been taking for 6-7 years, states has never been helpful. States placed on it during separation from ex-wife, for depression and anxiety.    When depressed, start thinking about not having income, don't know going to do, and have no answers. Can be at friend's house cooking or having a few drinks and get quiet, want to be alone. "Ghost mode."     Sleep is bad. Takes z quill to go to sleep, may sleep a few hours, wake up, take a dip, play on phone, may be several hours before can fall back asleep. "   Interest: Denies   Guilt: Denies   Energy: Poor: Way down right now  Concentration: Not like it used to be, unable to focus  Appetite: Good, normal  Psychomotor: Normal  Suicide: Denies     He has a Rx for Ambien CR 12.5mg at [facility] that he has not been able to  from the pharmacy. He was told that the medication needed to be re-coded and then sent back to his insurance company.  Patient reports that he has been on Ambien CR 12.5mg in the past. He states that he does not sleep walk or eat while on Ambien.   He reports that he was required by his employer to undergo a sleep study while on Ambien to determine that he would tolerate the medication and he did so without incidence.     Will also add Buspar 5mg two to three times a day as needed for anxiety.       Medications:   Current Outpatient Medications   Medication Instructions    blood sugar diagnostic Strp To check BG 2 times daily, to use with insurance preferred meter    blood-glucose meter kit To check BG 2 times daily, to use with insurance preferred meter    busPIRone (BUSPAR) 5 mg, Oral, 3 times daily    DULoxetine (CYMBALTA) 30 mg, Oral, Daily    glipiZIDE 5 mg, Oral, With breakfast    lancets Misc To check BG 2 times daily, to use with insurance preferred meter    metFORMIN (GLUCOPHAGE) 500 mg, Oral, 2 times daily with meals    miconazole NITRATE 2 % (ZEASORB AF) 2 % top powder Topical (Top), 2 times daily, And then as needed.  Make sure to apply between toe webs.    rosuvastatin (CRESTOR) 5 mg, Oral, Nightly    zolpidem (AMBIEN CR) 12.5 mg, Oral, Nightly PRN        Psychiatric History:   Reports a prior psychiatric history: Depression Anxiety  History of mental health out-patient treatment: Denies  History of in-patient psychiatric hospitalization: Denies   History of suicidal ideations: denies   History of suicidal threats: Denies   History of suicide attempts: Denies   History of self mutilation: Denies     History of psychotropic medications:  Zoloft    Family Psychiatric History:  Mental Illness: Son - mental health diagnosis   Alcohol abuse/addiction: Denies   Drug addiction: Denies     Substance Use History:  Alcohol: drinks 1 time every 3 months, usually 1 glass of whiskey and coke  Marijuana: Denies   Benzodiazepines: Denies   Opiates: Denies   Stimulants:  Cocaine: Denies   Methamphetamine: Denies  Nicotine: Chew, dip, 2 cans a week  Caffeine: Cut out caffeine 4 months ago    Social History:   Grew up in: [place]   Raised by: Mother and Father   Number of siblings: 1 sister   Education: 6th grade, mother held back 2 times in  and 1st grade, then in 6th grade had appendicitis and was out for an extended period of time for healing  Employment: [employer], out on workman's comp for 6 weeks  Marital Status: Single  Children: 2 boys   Living situation: House  Shinto affiliation: Denies     Trauma History:  History of Emotional/Mental abuse: Denies   History of Physical abuse: Denies   History of Sexual abuse: Denies   History of other trauma: Denies     Legal History:  Legal history: Denies   Denies being on probation or parole Denies   Denies any upcoming court dates Denies   Denies any pending charges. Denies     PHQ Score:   10/18/2023: 17 moderate    DOUG-7 Score:   10/18/2023: 17 severe    Mental Status Evaluation:  Appearance:  unremarkable, age appropriate   Behavior:  normal, cooperative   Speech:  no latency; no press   Mood:  euthymic   Affect:  congruent and appropriate   Thought Process:  normal and logical   Thought Content:  normal, no suicidality, no homicidality, delusions, or paranoia   Sensorium:  grossly intact   Cognition:  grossly intact   Insight:  intact   Judgment:  behavior is adequate to circumstances     Impression:  Generalized anxiety disorder  2. Major Depression Disorder recurrent moderate  3. Insomnia     Plan:  Discontinue Zoloft, taper down to 50 mg x 2 weeks, then discontinue  2. Start Cymbalta 30 mg daily  while tapering Zoloft   3. Start Buspar 5mg BID or TID  4. Follow-up in 8 weeks     Follow up in about 8 weeks (around 12/13/2023) for medication management, face to face.     DISCHARGE

## 2023-11-06 ENCOUNTER — OFFICE VISIT (OUTPATIENT)
Dept: FAMILY MEDICINE | Facility: CLINIC | Age: 43
End: 2023-11-06
Payer: MEDICAID

## 2023-11-06 VITALS
OXYGEN SATURATION: 97 % | WEIGHT: 315 LBS | RESPIRATION RATE: 18 BRPM | HEART RATE: 74 BPM | HEIGHT: 72 IN | TEMPERATURE: 98 F | DIASTOLIC BLOOD PRESSURE: 83 MMHG | SYSTOLIC BLOOD PRESSURE: 116 MMHG | BODY MASS INDEX: 42.66 KG/M2

## 2023-11-06 DIAGNOSIS — E66.01 CLASS 3 SEVERE OBESITY DUE TO EXCESS CALORIES WITH SERIOUS COMORBIDITY AND BODY MASS INDEX (BMI) OF 40.0 TO 44.9 IN ADULT: ICD-10-CM

## 2023-11-06 DIAGNOSIS — G47.30 ENCOUNTER FOR COUNSELING FOR SLEEP APNEA: ICD-10-CM

## 2023-11-06 DIAGNOSIS — I15.8 OTHER SECONDARY HYPERTENSION: ICD-10-CM

## 2023-11-06 DIAGNOSIS — Z71.89 ENCOUNTER FOR COUNSELING FOR SLEEP APNEA: ICD-10-CM

## 2023-11-06 DIAGNOSIS — E11.65 TYPE 2 DIABETES MELLITUS WITH HYPERGLYCEMIA, WITHOUT LONG-TERM CURRENT USE OF INSULIN: Primary | ICD-10-CM

## 2023-11-06 PROBLEM — E66.813 CLASS 3 SEVERE OBESITY DUE TO EXCESS CALORIES WITH SERIOUS COMORBIDITY AND BODY MASS INDEX (BMI) OF 40.0 TO 44.9 IN ADULT: Status: ACTIVE | Noted: 2023-11-06

## 2023-11-06 LAB
EST. AVERAGE GLUCOSE BLD GHB EST-MCNC: 283.4 MG/DL
HBA1C MFR BLD: 11.5 %
TSH SERPL-ACNC: 2.42 UIU/ML (ref 0.35–4.94)

## 2023-11-06 PROCEDURE — 3046F HEMOGLOBIN A1C LEVEL >9.0%: CPT | Mod: CPTII,,, | Performed by: NURSE PRACTITIONER

## 2023-11-06 PROCEDURE — 84443 ASSAY THYROID STIM HORMONE: CPT | Performed by: NURSE PRACTITIONER

## 2023-11-06 PROCEDURE — 3060F POS MICROALBUMINURIA REV: CPT | Mod: CPTII,,, | Performed by: NURSE PRACTITIONER

## 2023-11-06 PROCEDURE — 3079F DIAST BP 80-89 MM HG: CPT | Mod: CPTII,,, | Performed by: NURSE PRACTITIONER

## 2023-11-06 PROCEDURE — 3079F PR MOST RECENT DIASTOLIC BLOOD PRESSURE 80-89 MM HG: ICD-10-PCS | Mod: CPTII,,, | Performed by: NURSE PRACTITIONER

## 2023-11-06 PROCEDURE — 3008F PR BODY MASS INDEX (BMI) DOCUMENTED: ICD-10-PCS | Mod: CPTII,,, | Performed by: NURSE PRACTITIONER

## 2023-11-06 PROCEDURE — 3046F PR MOST RECENT HEMOGLOBIN A1C LEVEL > 9.0%: ICD-10-PCS | Mod: CPTII,,, | Performed by: NURSE PRACTITIONER

## 2023-11-06 PROCEDURE — 3066F NEPHROPATHY DOC TX: CPT | Mod: CPTII,,, | Performed by: NURSE PRACTITIONER

## 2023-11-06 PROCEDURE — 3074F SYST BP LT 130 MM HG: CPT | Mod: CPTII,,, | Performed by: NURSE PRACTITIONER

## 2023-11-06 PROCEDURE — 3066F PR DOCUMENTATION OF TREATMENT FOR NEPHROPATHY: ICD-10-PCS | Mod: CPTII,,, | Performed by: NURSE PRACTITIONER

## 2023-11-06 PROCEDURE — 3008F BODY MASS INDEX DOCD: CPT | Mod: CPTII,,, | Performed by: NURSE PRACTITIONER

## 2023-11-06 PROCEDURE — 99215 OFFICE O/P EST HI 40 MIN: CPT | Mod: PBBFAC | Performed by: NURSE PRACTITIONER

## 2023-11-06 PROCEDURE — 3060F PR POS MICROALBUMINURIA RESULT DOCUMENTED/REVIEW: ICD-10-PCS | Mod: CPTII,,, | Performed by: NURSE PRACTITIONER

## 2023-11-06 PROCEDURE — 1159F MED LIST DOCD IN RCRD: CPT | Mod: CPTII,,, | Performed by: NURSE PRACTITIONER

## 2023-11-06 PROCEDURE — 1160F PR REVIEW ALL MEDS BY PRESCRIBER/CLIN PHARMACIST DOCUMENTED: ICD-10-PCS | Mod: CPTII,,, | Performed by: NURSE PRACTITIONER

## 2023-11-06 PROCEDURE — 1159F PR MEDICATION LIST DOCUMENTED IN MEDICAL RECORD: ICD-10-PCS | Mod: CPTII,,, | Performed by: NURSE PRACTITIONER

## 2023-11-06 PROCEDURE — 3074F PR MOST RECENT SYSTOLIC BLOOD PRESSURE < 130 MM HG: ICD-10-PCS | Mod: CPTII,,, | Performed by: NURSE PRACTITIONER

## 2023-11-06 PROCEDURE — 83036 HEMOGLOBIN GLYCOSYLATED A1C: CPT | Performed by: NURSE PRACTITIONER

## 2023-11-06 PROCEDURE — 1160F RVW MEDS BY RX/DR IN RCRD: CPT | Mod: CPTII,,, | Performed by: NURSE PRACTITIONER

## 2023-11-06 PROCEDURE — 36415 COLL VENOUS BLD VENIPUNCTURE: CPT | Performed by: NURSE PRACTITIONER

## 2023-11-06 PROCEDURE — 99214 PR OFFICE/OUTPT VISIT, EST, LEVL IV, 30-39 MIN: ICD-10-PCS | Mod: S$PBB,,, | Performed by: NURSE PRACTITIONER

## 2023-11-06 PROCEDURE — 99214 OFFICE O/P EST MOD 30 MIN: CPT | Mod: S$PBB,,, | Performed by: NURSE PRACTITIONER

## 2023-11-06 RX ORDER — SERTRALINE HYDROCHLORIDE 50 MG/1
1 TABLET, FILM COATED ORAL EVERY MORNING
COMMUNITY
Start: 2017-10-11 | End: 2023-11-06 | Stop reason: ALTCHOICE

## 2023-11-06 RX ORDER — VALSARTAN 40 MG/1
40 TABLET ORAL DAILY
Qty: 90 TABLET | Refills: 3 | Status: SHIPPED | OUTPATIENT
Start: 2023-11-06 | End: 2024-11-05

## 2023-11-06 RX ORDER — METHOCARBAMOL 750 MG/1
750 TABLET, FILM COATED ORAL 3 TIMES DAILY
COMMUNITY
Start: 2023-09-01

## 2023-11-06 RX ORDER — GABAPENTIN 300 MG/1
300 CAPSULE ORAL 3 TIMES DAILY
COMMUNITY
Start: 2023-10-10

## 2023-11-06 NOTE — ASSESSMENT & PLAN NOTE
10/11/2023 hemoglobin A1c 11.3%.  Patient started on metformin 500 mg p.o. b.i.d. and glipizide 5 mg p.o. daily.  Patient state he is following diabetic diet meal planning and stay hydrated with water.  Today will repeat hemoglobin A1c and adjust medication.

## 2023-11-06 NOTE — PATIENT INSTRUCTIONS
Jaspreet Mclaughlin,     If you are due for any health screening(s) below please notify me so we can arrange them to be ordered and scheduled. Most healthy patients at your age complete them, but you are free to accept or refuse.     If you can't do it, I'll definitely understand. If you can, I'd certainly appreciate it!    Tests to Keep You Healthy    Eye Exam: Met on 10/11/2023  Last HbA1c < 8 (10/11/2023): NO      Lets manage your high blood pressure     Your blood pressure was above 140/90 today during your visit. We recommend that you schedule a nurse visit in two weeks to check your blood pressure and discuss ways to support your health goals.     You can also manage your health and record your blood pressure from the comfort of home by keeping a daily blood pressure log. These results are shared with and reviewed by your provider. Please print this form (Daily Blood Pressure Log) to assist you in keeping track of your blood pressure at home.     Schedule your nurse visit in two weeks to learn more about how to track and manage high blood pressure.    Daily Blood Pressure Log    Name:__________________________________                  Date of Birth:_________    Average Blood Pressure:  __________      Date: Time  (a.m.) Blood  Pressure: Pulse  Rate: Time  (p.m.) Blood  Pressure : Pulse  Rate:   Sample 8:37 127/83 84                                                                                                                                                                                   Lets manage your A1c levels     Your last hemoglobin A1c was higher than the goal of less than 8. Hemoglobin A1c or HbA1c is a blood test that measures your average blood sugar levels over the past 3 months. It is the main test to help you and your health care team manage your diabetes.     Higher A1c levels are linked to diabetes complications, such as loss of vision, kidney disease, and nerve damage. Keeping your A1c at least less  than 8 is important to stay healthy and we are here to help. Talk with your provider on how you can further improve your A1c.     We recommend that you set up blood work to get a repeat hemoglobin A1c in 3 months to monitor your diabetes. Let your health care team know if you have questions.

## 2023-11-06 NOTE — PROGRESS NOTES
Patient Name: Arnoldo Crane Jr.   : 1980  MRN: [id number]     SUBJECTIVE DATA:    CHIEF COMPLAINT:   Arnoldo Crane Jr. is a 43 y.o. male who presents to clinic today with Diabetes        HPI:  43-year-old male presents to the clinic to follow-up on diabetes as well referral to sleep Clinic to rule out sleep apnea.    Diabetes: 10/11/2023 hemoglobin A1c 11.3%.  Patient started on metformin 500 mg p.o. b.i.d. and glipizide 5 mg p.o. daily.  Patient state he is following diabetic diet meal planning and stay hydrated with water.  Today will repeat hemoglobin A1c and adjust medication.  2023 addendum:  Hemoglobin A1c 11.5%.  Discussed with patient will increase metformin to a 1000 mg p.o. b.i.d. and increase glipizide to 10 mg p.o. once daily.  Return to clinic as discussed.  Patient verbalized.    Sleep Study:  BMI 42.99. 2023 TSH 6.657.  Will repeat today.  Dorothy Sleepiness Score is 10.  Sleep study referral initiated.    Patient denies chest pain, shortness of breath, dyspnea on exertion, palpitations, peripheral edema, abdominal pain, nausea, vomiting, diarrhea, constipation, fatigue, fever, chills, dysuria,  hematuria, melena, or hematochezia.     Patient to return in 2023 for diabetes follow-up and medication efficacy.    ALLERGIES: Review of patient's allergies indicates:  No Known Allergies      ROS:  Review of Systems   Constitutional:         Morbid obese.   Psychiatric/Behavioral:  The patient has insomnia.    All other systems reviewed and are negative.        OBJECTIVE DATA:  Vital signs  Vitals:    23 1400 23 1444   BP: (!) 138/90 116/83   Pulse: 74    Resp: 18    Temp: 98.2 °F (36.8 °C)    TempSrc: Oral    SpO2: 97%    Weight: (!) 143.8 kg (317 lb)    Height: 6' (1.829 m)       Body mass index is 42.99 kg/m².    PHYSICAL EXAM:   Physical Exam  Vitals and nursing note reviewed.   Constitutional:       General: He is awake.      Appearance: Normal  appearance. He is well-developed. He is morbidly obese.   HENT:      Head: Normocephalic and atraumatic.      Right Ear: Tympanic membrane, ear canal and external ear normal.      Left Ear: Tympanic membrane, ear canal and external ear normal.      Nose: Nose normal.      Mouth/Throat:      Mouth: Mucous membranes are moist.      Pharynx: Oropharynx is clear. Uvula midline.   Eyes:      General: Lids are normal.      Extraocular Movements: Extraocular movements intact.      Conjunctiva/sclera: Conjunctivae normal.      Pupils: Pupils are equal, round, and reactive to light.   Neck:      Trachea: Trachea and phonation normal.   Cardiovascular:      Rate and Rhythm: Normal rate and regular rhythm.      Pulses: Normal pulses.           Radial pulses are 2+ on the right side and 2+ on the left side.      Heart sounds: Normal heart sounds.   Pulmonary:      Effort: Pulmonary effort is normal.      Breath sounds: Normal breath sounds and air entry. No wheezing or rhonchi.   Abdominal:      Palpations: Abdomen is soft.   Musculoskeletal:         General: Normal range of motion.      Cervical back: Normal range of motion.   Skin:     General: Skin is warm.      Capillary Refill: Capillary refill takes less than 2 seconds.   Neurological:      General: No focal deficit present.      Mental Status: He is alert and oriented to person, place, and time. Mental status is at baseline.      GCS: GCS eye subscore is 4. GCS verbal subscore is 5. GCS motor subscore is 6.      Cranial Nerves: No cranial nerve deficit.      Sensory: No sensory deficit.      Motor: No weakness.      Coordination: Coordination normal.      Gait: Gait normal.   Psychiatric:         Attention and Perception: Attention and perception normal.         Mood and Affect: Mood and affect normal.         Speech: Speech normal.         Behavior: Behavior normal. Behavior is cooperative.         Thought Content: Thought content normal.         Cognition and Memory:  Cognition and memory normal.         Judgment: Judgment normal.          ASSESSMENT/PLAN:  1. Type 2 diabetes mellitus with hyperglycemia, without long-term current use of insulin  Assessment & Plan:  10/11/2023 hemoglobin A1c 11.3%.  Patient started on metformin 500 mg p.o. b.i.d. and glipizide 5 mg p.o. daily.  Patient state he is following diabetic diet meal planning and stay hydrated with water.  Today will repeat hemoglobin A1c and adjust medication.    Orders:  -     Hemoglobin A1C  -     valsartan (DIOVAN) 40 MG tablet; Take 1 tablet (40 mg total) by mouth once daily.  Dispense: 90 tablet; Refill: 3    2. Other secondary hypertension  Assessment & Plan:  Rx Diovan 40 mg p.o. once daily.  Follow low-salt diet less than 2 g per day if possible.  Continue to monitor blood pressure home.    Orders:  -     valsartan (DIOVAN) 40 MG tablet; Take 1 tablet (40 mg total) by mouth once daily.  Dispense: 90 tablet; Refill: 3    3. Class 3 severe obesity due to excess calories with serious comorbidity and body mass index (BMI) of 40.0 to 44.9 in adult  Assessment & Plan:  Follow diabetic diet meal planning.  Portion control.  Calorie control.  Keep salt intake less than 2 g per day if possible.  Follow low-fat, low-cholesterol diet.  Stay physically active at least 30 minutes a day up to 5 days a week as simple as brisk walking and as tolerated.  Hydrated with water.  Fiber intake 30 g per day if possible.  Read discharge education material.      4. Encounter for counseling for sleep apnea  -     Ambulatory referral/consult to Sleep Disorders; Future; Expected date: 11/13/2023  -     TSH           RESULTS:  Recent Results (from the past 1008 hour(s))   Basic metabolic panel    Collection Time: 09/29/23  6:20 AM   Result Value Ref Range    Sodium Level 133 (L) 136 - 145 mmol/L    Potassium Level 4.3 3.5 - 5.1 mmol/L    Chloride 97 (L) 98 - 107 mmol/L    Carbon Dioxide 15 (L) 22 - 29 mmol/L    Glucose Level 575 (HH) 74 -  100 mg/dL    Blood Urea Nitrogen 7.3 (L) 8.9 - 20.6 mg/dL    Creatinine 1.30 (H) 0.73 - 1.18 mg/dL    BUN/Creatinine Ratio 6     Calcium Level Total 9.2 8.4 - 10.2 mg/dL    Anion Gap 21.0 mEq/L    eGFR >60 mls/min/1.73/m2   CBC with Differential    Collection Time: 09/29/23  6:20 AM   Result Value Ref Range    WBC 6.05 4.50 - 11.50 x10(3)/mcL    RBC 5.16 4.70 - 6.10 x10(6)/mcL    Hgb 14.9 14.0 - 18.0 g/dL    Hct 42.8 42.0 - 52.0 %    MCV 82.9 80.0 - 94.0 fL    MCH 28.9 27.0 - 31.0 pg    MCHC 34.8 33.0 - 36.0 g/dL    RDW 12.8 11.5 - 17.0 %    Platelet 219 130 - 400 x10(3)/mcL    MPV 11.6 (H) 7.4 - 10.4 fL    Neut % 47.6 %    Lymph % 36.4 %    Mono % 12.4 %    Eos % 2.0 %    Basophil % 0.8 %    Lymph # 2.20 0.6 - 4.6 x10(3)/mcL    Neut # 2.88 2.1 - 9.2 x10(3)/mcL    Mono # 0.75 0.1 - 1.3 x10(3)/mcL    Eos # 0.12 0 - 0.9 x10(3)/mcL    Baso # 0.05 <=0.2 x10(3)/mcL    IG# 0.05 (H) 0 - 0.04 x10(3)/mcL    IG% 0.8 %    NRBC% 0.0 %   Beta-Hydroxybutyrate, Serum    Collection Time: 09/29/23  6:20 AM   Result Value Ref Range    Beta Hydroxybutyrate 4.70 (H) <=0.30 mmol/L   Lipid Panel    Collection Time: 09/29/23  6:20 AM   Result Value Ref Range    Cholesterol Total 172 <=200 mg/dL    HDL Cholesterol 22 (L) 35 - 60 mg/dL    Triglyceride 1,013 (H) 34 - 140 mg/dL    Cholesterol/HDL Ratio 8 (H) 0 - 5    Very Low Density Lipoprotein 203     LDL Cholesterol -53.00 (L) 50.00 - 140.00 mg/dL   TSH    Collection Time: 09/29/23  6:20 AM   Result Value Ref Range    TSH 6.657 (H) 0.350 - 4.940 uIU/mL   T4, Free    Collection Time: 09/29/23  6:20 AM   Result Value Ref Range    Thyroxine Free 1.05 0.70 - 1.48 ng/dL   Lipase    Collection Time: 09/29/23  6:20 AM   Result Value Ref Range    Lipase Level 20 <=60 U/L   Amylase    Collection Time: 09/29/23  6:20 AM   Result Value Ref Range    Amylase Level 36 25 - 125 unit/L   Urinalysis, Reflex to Urine Culture    Collection Time: 09/29/23  6:26 AM    Specimen: Urine   Result Value Ref Range     Color, UA Colorless (A) Yellow, Light-Yellow, Dark Yellow, Yuly, Straw    Appearance, UA Clear Clear    Specific Gravity, UA 1.034 (H) 1.005 - 1.030    pH, UA 5.0 5.0 - 8.5    Protein, UA Negative Negative    Glucose, UA 4+ (A) Negative, Normal    Ketones, UA 3+ (A) Negative    Blood, UA Negative Negative    Bilirubin, UA Negative Negative    Urobilinogen, UA Normal 0.2, 1.0, Normal    Nitrites, UA Negative Negative    Leukocyte Esterase, UA Negative Negative    WBC, UA 0-5 None Seen, 0-2, 3-5, 0-5 /HPF    Bacteria, UA None Seen None Seen /HPF    Squamous Epithelial Cells, UA None Seen None Seen /HPF    Mucous, UA Trace (A) None Seen /LPF    Hyaline Casts, UA None Seen None Seen /lpf    RBC, UA 0-5 None Seen, 0-2, 3-5, 0-5 /HPF   Brain natriuretic peptide    Collection Time: 09/29/23  7:29 AM   Result Value Ref Range    Natriuretic Peptide <10.0 <=100.0 pg/mL   POCT glucose    Collection Time: 09/29/23  7:49 AM   Result Value Ref Range    POCT Glucose >500 (HH) 70 - 110 mg/dL   RT Blood Gas    Collection Time: 09/29/23  8:59 AM   Result Value Ref Range    Sample Type Venous Blood     Drawn by lab     pH, Blood gas 7.320 7.300 - 7.400    pCO2, Blood gas 50.0 40.0 - 50.0 mmHg    pO2, Blood gas 34.0 30.0 - 40.0 mmHg    TOC2, Blood gas 27.3 mmol/L    Base Excess, Blood gas -1.00 mmol/L    sO2, Blood gas 56.3 %    HCO3, Blood gas 25.8 mmol/L    THb, Blood gas 13.9 g/dL    O2 Hb, Blood Gas 54.8 %    CO Hgb 1.8 %    Met Hgb 0.9 %    Allens Test N/A    POCT glucose    Collection Time: 09/29/23  9:12 AM   Result Value Ref Range    POCT Glucose 314 (H) 70 - 110 mg/dL   POCT glucose    Collection Time: 09/29/23 10:02 AM   Result Value Ref Range    POCT Glucose 242 (H) 70 - 110 mg/dL   Drug Screen, Urine    Collection Time: 09/29/23 10:37 AM   Result Value Ref Range    Amphetamines, Urine Negative Negative    Barbituates, Urine Negative Negative    Benzodiazepine, Urine Negative Negative    Cannabinoids, Urine Negative  Negative    Cocaine, Urine Negative Negative    Fentanyl, Urine Negative Negative    MDMA, Urine Negative Negative    Opiates, Urine Negative Negative    Phencyclidine, Urine Negative Negative    pH, Urine 5.5 3.0 - 11.0   POCT glucose    Collection Time: 09/29/23 11:17 AM   Result Value Ref Range    POCT Glucose 146 (H) 70 - 110 mg/dL   Basic metabolic panel    Collection Time: 09/29/23 12:17 PM   Result Value Ref Range    Sodium Level 140 136 - 145 mmol/L    Potassium Level 4.1 3.5 - 5.1 mmol/L    Chloride 107 98 - 107 mmol/L    Carbon Dioxide 15 (L) 22 - 29 mmol/L    Glucose Level 140 (H) 74 - 100 mg/dL    Blood Urea Nitrogen 10.9 8.9 - 20.6 mg/dL    Creatinine 0.93 0.73 - 1.18 mg/dL    BUN/Creatinine Ratio 12     Calcium Level Total 9.2 8.4 - 10.2 mg/dL    Anion Gap 18.0 mEq/L    eGFR >60 mls/min/1.73/m2   POCT glucose    Collection Time: 09/29/23  1:46 PM   Result Value Ref Range    POCT Glucose 161 (H) 70 - 110 mg/dL   POCT glucose    Collection Time: 09/29/23  2:58 PM   Result Value Ref Range    POCT Glucose 150 (H) 70 - 110 mg/dL   POCT glucose    Collection Time: 09/29/23  4:05 PM   Result Value Ref Range    POCT Glucose 142 (H) 70 - 110 mg/dL   Basic metabolic panel    Collection Time: 09/29/23  4:11 PM   Result Value Ref Range    Sodium Level 139 136 - 145 mmol/L    Potassium Level 3.6 3.5 - 5.1 mmol/L    Chloride 106 98 - 107 mmol/L    Carbon Dioxide 20 (L) 22 - 29 mmol/L    Glucose Level 150 (H) 74 - 100 mg/dL    Blood Urea Nitrogen 9.9 8.9 - 20.6 mg/dL    Creatinine 0.91 0.73 - 1.18 mg/dL    BUN/Creatinine Ratio 11     Calcium Level Total 9.1 8.4 - 10.2 mg/dL    Anion Gap 13.0 mEq/L    eGFR >60 mls/min/1.73/m2   POCT glucose    Collection Time: 09/29/23  5:01 PM   Result Value Ref Range    POCT Glucose 164 (H) 70 - 110 mg/dL   POCT glucose    Collection Time: 09/29/23  6:03 PM   Result Value Ref Range    POCT Glucose 142 (H) 70 - 110 mg/dL   POCT glucose    Collection Time: 09/29/23  7:08 PM   Result  Value Ref Range    POCT Glucose 144 (H) 70 - 110 mg/dL   C-Peptide    Collection Time: 10/11/23  1:19 PM   Result Value Ref Range    C-Peptide, S 6.2 (H) 1.1 - 4.4 ng/mL   Hemoglobin A1C    Collection Time: 10/11/23  1:19 PM   Result Value Ref Range    Hemoglobin A1c 11.3 (H) <=7.0 %    Estimated Average Glucose 277.6 mg/dL   Microalbumin/Creatinine Ratio, Urine    Collection Time: 10/11/23  1:19 PM   Result Value Ref Range    Urine Microalbumin 22.9 <=30.0 ug/ml    Urine Creatinine 73.4 63.0 - 166.0 mg/dL    Microalbumin Creatinine Ratio 31.2 (H) 0.0 - 30.0 mg/gm Cr   Comprehensive Metabolic Panel    Collection Time: 10/11/23  1:19 PM   Result Value Ref Range    Sodium Level 133 (L) 136 - 145 mmol/L    Potassium Level 4.5 3.5 - 5.1 mmol/L    Chloride 98 98 - 107 mmol/L    Carbon Dioxide 18 (L) 22 - 29 mmol/L    Glucose Level 366 (H) 74 - 100 mg/dL    Blood Urea Nitrogen 13.0 8.9 - 20.6 mg/dL    Creatinine 1.04 0.73 - 1.18 mg/dL    Calcium Level Total 10.1 8.4 - 10.2 mg/dL    Protein Total 9.6 (H) 6.4 - 8.3 gm/dL    Albumin Level 4.2 3.5 - 5.0 g/dL    Globulin 5.4 (H) 2.4 - 3.5 gm/dL    Albumin/Globulin Ratio 0.8 (L) 1.1 - 2.0 ratio    Bilirubin Total 0.7 <=1.5 mg/dL    Alkaline Phosphatase 73 40 - 150 unit/L    Alanine Aminotransferase 35 0 - 55 unit/L    Aspartate Aminotransferase 19 5 - 34 unit/L    eGFR >60 mls/min/1.73/m2         Follow Up:  Follow up in about 7 weeks (around 12/28/2023).      Previous medical history/lab work/radiology reviewed and considered during medical management decisions.   Medication list reviewed and medication reconciliation performed.  Patient was provided  and care about his/her current diagnosis (es) and medications including risk/benefit and side effects/adverse events, over the counter medication uses/doses, home self-care and contact precautions,  and red flags and indications for when to seek immediate medical attention.   Patient was advised to continue compliance with  current medication list and medical recommendations.  Patient dvised continued compliance with recommended eating habits/ diets for medical conditions and exercise 150 minutes/ week (if possible) for medical condition (s).  Educational handouts and instructions on selected disease management in AVS (After Visit Summary).    All of the patient's questions were answered to patient's satisfaction.   The patient was receptive, expressed verbal understanding and agreement the above plan.       This note was created with the assistance of a voice recognition software or phone dictation. There may be transcription errors as a result of using this technology however minimal. Effort has been made to assure accuracy of transcription but any obvious errors or omissions should be clarified with the author of the document

## 2023-11-06 NOTE — ASSESSMENT & PLAN NOTE
Rx Diovan 40 mg p.o. once daily.  Follow low-salt diet less than 2 g per day if possible.  Continue to monitor blood pressure home.

## 2023-11-06 NOTE — ASSESSMENT & PLAN NOTE
Follow diabetic diet meal planning.  Portion control.  Calorie control.  Keep salt intake less than 2 g per day if possible.  Follow low-fat, low-cholesterol diet.  Stay physically active at least 30 minutes a day up to 5 days a week as simple as brisk walking and as tolerated.  Hydrated with water.  Fiber intake 30 g per day if possible.  Read discharge education material.

## 2023-11-07 DIAGNOSIS — G47.33 OSA (OBSTRUCTIVE SLEEP APNEA): Primary | ICD-10-CM

## 2023-11-08 RX ORDER — METFORMIN HYDROCHLORIDE 1000 MG/1
1000 TABLET ORAL 2 TIMES DAILY WITH MEALS
Qty: 180 TABLET | Refills: 3 | Status: SHIPPED | OUTPATIENT
Start: 2023-11-08 | End: 2023-12-29

## 2023-11-08 RX ORDER — GLIPIZIDE 10 MG/1
10 TABLET, FILM COATED, EXTENDED RELEASE ORAL
Qty: 90 TABLET | Refills: 3 | Status: SHIPPED | OUTPATIENT
Start: 2023-11-08 | End: 2024-11-07

## 2023-11-08 NOTE — PROGRESS NOTES
Hemoglobin A1c 11.5%.  Discussed with patient will increase metformin to a 1000 mg p.o. b.i.d. and increase glipizide to 10 mg p.o. once daily.  Return to clinic as discussed.  Patient verbalized.

## 2023-12-20 ENCOUNTER — OFFICE VISIT (OUTPATIENT)
Dept: BEHAVIORAL HEALTH | Facility: CLINIC | Age: 43
End: 2023-12-20
Payer: MEDICAID

## 2023-12-20 DIAGNOSIS — F33.1 MODERATE EPISODE OF RECURRENT MAJOR DEPRESSIVE DISORDER: Primary | Chronic | ICD-10-CM

## 2023-12-20 DIAGNOSIS — F41.1 GAD (GENERALIZED ANXIETY DISORDER): Chronic | ICD-10-CM

## 2023-12-20 DIAGNOSIS — F51.04 PSYCHOPHYSIOLOGICAL INSOMNIA: ICD-10-CM

## 2023-12-20 PROCEDURE — 99213 OFFICE O/P EST LOW 20 MIN: CPT | Mod: PBBFAC,PN | Performed by: NURSE PRACTITIONER

## 2023-12-20 PROCEDURE — 3066F NEPHROPATHY DOC TX: CPT | Mod: CPTII,,, | Performed by: NURSE PRACTITIONER

## 2023-12-20 PROCEDURE — 3060F POS MICROALBUMINURIA REV: CPT | Mod: CPTII,,, | Performed by: NURSE PRACTITIONER

## 2023-12-20 PROCEDURE — 4010F ACE/ARB THERAPY RXD/TAKEN: CPT | Mod: CPTII,,, | Performed by: NURSE PRACTITIONER

## 2023-12-20 PROCEDURE — 3046F HEMOGLOBIN A1C LEVEL >9.0%: CPT | Mod: CPTII,,, | Performed by: NURSE PRACTITIONER

## 2023-12-20 PROCEDURE — 1159F MED LIST DOCD IN RCRD: CPT | Mod: CPTII,,, | Performed by: NURSE PRACTITIONER

## 2023-12-20 PROCEDURE — 1160F RVW MEDS BY RX/DR IN RCRD: CPT | Mod: CPTII,,, | Performed by: NURSE PRACTITIONER

## 2023-12-20 PROCEDURE — 99213 OFFICE O/P EST LOW 20 MIN: CPT | Mod: S$PBB,,, | Performed by: NURSE PRACTITIONER

## 2023-12-20 PROCEDURE — 3060F PR POS MICROALBUMINURIA RESULT DOCUMENTED/REVIEW: ICD-10-PCS | Mod: CPTII,,, | Performed by: NURSE PRACTITIONER

## 2023-12-20 PROCEDURE — 3046F PR MOST RECENT HEMOGLOBIN A1C LEVEL > 9.0%: ICD-10-PCS | Mod: CPTII,,, | Performed by: NURSE PRACTITIONER

## 2023-12-20 PROCEDURE — 99213 PR OFFICE/OUTPT VISIT, EST, LEVL III, 20-29 MIN: ICD-10-PCS | Mod: S$PBB,,, | Performed by: NURSE PRACTITIONER

## 2023-12-20 PROCEDURE — 4010F PR ACE/ARB THEARPY RXD/TAKEN: ICD-10-PCS | Mod: CPTII,,, | Performed by: NURSE PRACTITIONER

## 2023-12-20 PROCEDURE — 3066F PR DOCUMENTATION OF TREATMENT FOR NEPHROPATHY: ICD-10-PCS | Mod: CPTII,,, | Performed by: NURSE PRACTITIONER

## 2023-12-20 PROCEDURE — 1160F PR REVIEW ALL MEDS BY PRESCRIBER/CLIN PHARMACIST DOCUMENTED: ICD-10-PCS | Mod: CPTII,,, | Performed by: NURSE PRACTITIONER

## 2023-12-20 PROCEDURE — 1159F PR MEDICATION LIST DOCUMENTED IN MEDICAL RECORD: ICD-10-PCS | Mod: CPTII,,, | Performed by: NURSE PRACTITIONER

## 2023-12-20 RX ORDER — BUSPIRONE HYDROCHLORIDE 5 MG/1
5 TABLET ORAL 3 TIMES DAILY
Qty: 60 TABLET | Refills: 3 | Status: SHIPPED | OUTPATIENT
Start: 2023-12-20 | End: 2024-02-21 | Stop reason: DRUGHIGH

## 2023-12-20 RX ORDER — DULOXETIN HYDROCHLORIDE 60 MG/1
60 CAPSULE, DELAYED RELEASE ORAL DAILY
Qty: 30 CAPSULE | Refills: 3 | Status: SHIPPED | OUTPATIENT
Start: 2023-12-20 | End: 2024-02-21 | Stop reason: SDUPTHER

## 2023-12-20 RX ORDER — ZOLPIDEM TARTRATE 12.5 MG/1
12.5 TABLET, FILM COATED, EXTENDED RELEASE ORAL NIGHTLY PRN
Qty: 30 TABLET | Refills: 3 | Status: SHIPPED | OUTPATIENT
Start: 2023-12-20 | End: 2024-02-21 | Stop reason: SDUPTHER

## 2023-12-20 NOTE — PROGRESS NOTES
Follow up #1  12/20/2023  HPI: Arnoldo Guthrieelizabeth Henderson is a 43 y.o. male here today for a psychiatric evaluation referred by PCP to the [facility] for anxiety and depression  Past Medical History: Anxiety disorder, unspecified, Depression, Insomnia, Morbidly obese, Type 2 diabetes mellitus without complications     On his last visit, he was to taper off of Zoloft and start Cymbalta 30mg a day and Buspar BID to TID.     Today, patient states that he is doing much better on Cymbalta than he was on Zoloft. States that even he notices a difference.   He takes the Cymbalta daily and the Buspar twice a day.  Will increase Cymbalta to 60mg a day.   Sleeping well with Ambien. He denies sleep walking.   Was supposed to have a sleep study done 5 weeks ago but he was in too much pain and it has not yet been rescheduled.     PHQ Score:   12/20/2023: 2   10/18/2023: 17 moderate    DOUG-7 Score:   12/20/2023: 0  10/18/2023: 17 severe    Mental Status Evaluation:  Appearance:  unremarkable, age appropriate   Behavior:  normal, cooperative   Speech:  no latency; no press   Mood:  euthymic   Affect:  congruent and appropriate   Thought Process:  normal and logical   Thought Content:  normal, no suicidality, no homicidality, delusions, or paranoia   Sensorium:  grossly intact   Cognition:  grossly intact   Insight:  intact   Judgment:  behavior is adequate to circumstances     Impression:  Generalized anxiety disorder  2. Major Depression Disorder recurrent moderate  3. Insomnia     Plan:  Increase Cymbalta to 60 mg   2. Continue Buspar 5mg BID or TID  3. Continue Ambien CR 12.5mg at HS  3. Follow-up in 8 weeks      Initial Interview  10/18/2023  HPI: Arnoldo Guthrieelizabeth Henderson is a 43 y.o. male here today for a psychiatric evaluation referred by PCP to the [facility] for anxiety and depression  Past Medical History: Anxiety disorder, unspecified, Depression, Insomnia, Morbidly obese, Type 2 diabetes mellitus without  "complications       Patient states, " I have a lot of social anxiety, for the past couple of years. Hard to go, for instance, to the grocery store, I will sit in the parking lot for 10-15 minutes before going in. The same thing happens going to friends for cookouts, will go, and then my mood changes and then I am ready to go."    Unsure of any triggers for social anxiety.     Recently, 6 weeks ago, got hurt at work, went to urgent care for treatment, gave shot and back brace (Monday), then 4 days later, went back, received 2 shots and xray, sent home. The next Monday, went to hospital due to increase in pain and weakness, confusion. Found to be in diabetic ketoacidosis.  was hospitalized and placed on insulin drip, but signed himself out the next day AMA.     Works as a , went to  something and felt rip across  back, couldn't hardly move. That day went to the urgent care.  trying to get an MRI pushed through. Unsure if salary will be paid. Knows it will be a long process. Using ice therapy for pain,  does not like pain medication.     Currently taking Zoloft 100 mg daily, has been taking for 6-7 years, states has never been helpful. States placed on it during separation from ex-wife, for depression and anxiety.    When depressed, start thinking about not having income, don't know going to do, and have no answers. Can be at friend's house cooking or having a few drinks and get quiet, want to be alone. "Ghost mode."     Sleep is bad. Takes z quill to go to sleep, may sleep a few hours, wake up, take a dip, play on phone, may be several hours before can fall back asleep.   Interest: Denies   Guilt: Denies   Energy: Poor: Way down right now  Concentration: Not like it used to be, unable to focus  Appetite: Good, normal  Psychomotor: Normal  Suicide: Denies     He has a Rx for Ambien CR 12.5mg at [facility] that he has not been able to  from the pharmacy. He was told that the medication " needed to be re-coded and then sent back to his insurance company.  Patient reports that he has been on Ambien CR 12.5mg in the past. He states that he does not sleep walk or eat while on Ambien.   He reports that he was required by his employer to undergo a sleep study while on Ambien to determine that he would tolerate the medication and he did so without incidence.     Will also add Buspar 5mg two to three times a day as needed for anxiety.       Medications:   Current Outpatient Medications   Medication Instructions    blood sugar diagnostic Strp To check BG 2 times daily, to use with insurance preferred meter    blood-glucose meter kit To check BG 2 times daily, to use with insurance preferred meter    busPIRone (BUSPAR) 5 mg, Oral, 3 times daily    DULoxetine (CYMBALTA) 30 mg, Oral, Daily    glipiZIDE 5 mg, Oral, With breakfast    lancets Misc To check BG 2 times daily, to use with insurance preferred meter    metFORMIN (GLUCOPHAGE) 500 mg, Oral, 2 times daily with meals    miconazole NITRATE 2 % (ZEASORB AF) 2 % top powder Topical (Top), 2 times daily, And then as needed.  Make sure to apply between toe webs.    rosuvastatin (CRESTOR) 5 mg, Oral, Nightly    zolpidem (AMBIEN CR) 12.5 mg, Oral, Nightly PRN        Psychiatric History:   Reports a prior psychiatric history: Depression Anxiety  History of mental health out-patient treatment: Denies  History of in-patient psychiatric hospitalization: Denies   History of suicidal ideations: denies   History of suicidal threats: Denies   History of suicide attempts: Denies   History of self mutilation: Denies     History of psychotropic medications: Zoloft    Family Psychiatric History:  Mental Illness: Son - mental health diagnosis   Alcohol abuse/addiction: Denies   Drug addiction: Denies     Substance Use History:  Alcohol: drinks 1 time every 3 months, usually 1 glass of whiskey and coke  Marijuana: Denies   Benzodiazepines: Denies   Opiates: Denies    Stimulants:  Cocaine: Denies   Methamphetamine: Denies  Nicotine: Chew, dip, 2 cans a week  Caffeine: Cut out caffeine 4 months ago    Social History:   Grew up in: [place]   Raised by: Mother and Father   Number of siblings: 1 sister   Education: 6th grade, mother held back 2 times in  and 1st grade, then in 6th grade had appendicitis and was out for an extended period of time for healing  Employment: , out on workman's comp for 6 weeks  Marital Status: Single  Children: 2 boys   Living situation: House  [employer] affiliation: Denies     Trauma History:  History of Emotional/Mental abuse: Denies   History of Physical abuse: Denies   History of Sexual abuse: Denies   History of other trauma: Denies     Legal History:  Legal history: Denies   Denies being on probation or parole Denies   Denies any upcoming court dates Denies   Denies any pending charges. Denies     PHQ Score:   10/18/2023: 17 moderate    DOUG-7 Score:   10/18/2023: 17 severe    Mental Status Evaluation:  Appearance:  unremarkable, age appropriate   Behavior:  normal, cooperative   Speech:  no latency; no press   Mood:  euthymic   Affect:  congruent and appropriate   Thought Process:  normal and logical   Thought Content:  normal, no suicidality, no homicidality, delusions, or paranoia   Sensorium:  grossly intact   Cognition:  grossly intact   Insight:  intact   Judgment:  behavior is adequate to circumstances     Impression:  Generalized anxiety disorder  2. Major Depression Disorder recurrent moderate  3. Insomnia     Plan:  Discontinue Zoloft, taper down to 50 mg x 2 weeks, then discontinue  2. Start Cymbalta 30 mg daily while tapering Zoloft   3. Start Buspar 5mg BID or TID  4. Follow-up in 8 weeks     Follow up in about 8 weeks (around 12/13/2023) for medication management, face to face.

## 2023-12-29 ENCOUNTER — OFFICE VISIT (OUTPATIENT)
Dept: FAMILY MEDICINE | Facility: CLINIC | Age: 43
End: 2023-12-29
Payer: MEDICAID

## 2023-12-29 VITALS
DIASTOLIC BLOOD PRESSURE: 85 MMHG | OXYGEN SATURATION: 99 % | RESPIRATION RATE: 18 BRPM | SYSTOLIC BLOOD PRESSURE: 122 MMHG | BODY MASS INDEX: 42.66 KG/M2 | HEART RATE: 73 BPM | WEIGHT: 315 LBS | TEMPERATURE: 98 F | HEIGHT: 72 IN

## 2023-12-29 DIAGNOSIS — I15.8 OTHER SECONDARY HYPERTENSION: ICD-10-CM

## 2023-12-29 DIAGNOSIS — E11.65 TYPE 2 DIABETES MELLITUS WITH HYPERGLYCEMIA, WITHOUT LONG-TERM CURRENT USE OF INSULIN: Primary | ICD-10-CM

## 2023-12-29 LAB — GLUCOSE SERPL-MCNC: 222 MG/DL (ref 70–110)

## 2023-12-29 PROCEDURE — 3079F PR MOST RECENT DIASTOLIC BLOOD PRESSURE 80-89 MM HG: ICD-10-PCS | Mod: CPTII,,, | Performed by: NURSE PRACTITIONER

## 2023-12-29 PROCEDURE — 1159F MED LIST DOCD IN RCRD: CPT | Mod: CPTII,,, | Performed by: NURSE PRACTITIONER

## 2023-12-29 PROCEDURE — 3060F PR POS MICROALBUMINURIA RESULT DOCUMENTED/REVIEW: ICD-10-PCS | Mod: CPTII,,, | Performed by: NURSE PRACTITIONER

## 2023-12-29 PROCEDURE — 3060F POS MICROALBUMINURIA REV: CPT | Mod: CPTII,,, | Performed by: NURSE PRACTITIONER

## 2023-12-29 PROCEDURE — 3008F BODY MASS INDEX DOCD: CPT | Mod: CPTII,,, | Performed by: NURSE PRACTITIONER

## 2023-12-29 PROCEDURE — 4010F PR ACE/ARB THEARPY RXD/TAKEN: ICD-10-PCS | Mod: CPTII,,, | Performed by: NURSE PRACTITIONER

## 2023-12-29 PROCEDURE — 99214 PR OFFICE/OUTPT VISIT, EST, LEVL IV, 30-39 MIN: ICD-10-PCS | Mod: S$PBB,,, | Performed by: NURSE PRACTITIONER

## 2023-12-29 PROCEDURE — 3074F SYST BP LT 130 MM HG: CPT | Mod: CPTII,,, | Performed by: NURSE PRACTITIONER

## 2023-12-29 PROCEDURE — 3066F NEPHROPATHY DOC TX: CPT | Mod: CPTII,,, | Performed by: NURSE PRACTITIONER

## 2023-12-29 PROCEDURE — 3008F PR BODY MASS INDEX (BMI) DOCUMENTED: ICD-10-PCS | Mod: CPTII,,, | Performed by: NURSE PRACTITIONER

## 2023-12-29 PROCEDURE — 3074F PR MOST RECENT SYSTOLIC BLOOD PRESSURE < 130 MM HG: ICD-10-PCS | Mod: CPTII,,, | Performed by: NURSE PRACTITIONER

## 2023-12-29 PROCEDURE — 3066F PR DOCUMENTATION OF TREATMENT FOR NEPHROPATHY: ICD-10-PCS | Mod: CPTII,,, | Performed by: NURSE PRACTITIONER

## 2023-12-29 PROCEDURE — 3079F DIAST BP 80-89 MM HG: CPT | Mod: CPTII,,, | Performed by: NURSE PRACTITIONER

## 2023-12-29 PROCEDURE — 99215 OFFICE O/P EST HI 40 MIN: CPT | Mod: PBBFAC | Performed by: NURSE PRACTITIONER

## 2023-12-29 PROCEDURE — 99214 OFFICE O/P EST MOD 30 MIN: CPT | Mod: S$PBB,,, | Performed by: NURSE PRACTITIONER

## 2023-12-29 PROCEDURE — 4010F ACE/ARB THERAPY RXD/TAKEN: CPT | Mod: CPTII,,, | Performed by: NURSE PRACTITIONER

## 2023-12-29 PROCEDURE — 82962 GLUCOSE BLOOD TEST: CPT | Mod: PBBFAC | Performed by: NURSE PRACTITIONER

## 2023-12-29 PROCEDURE — 3046F HEMOGLOBIN A1C LEVEL >9.0%: CPT | Mod: CPTII,,, | Performed by: NURSE PRACTITIONER

## 2023-12-29 PROCEDURE — 1159F PR MEDICATION LIST DOCUMENTED IN MEDICAL RECORD: ICD-10-PCS | Mod: CPTII,,, | Performed by: NURSE PRACTITIONER

## 2023-12-29 PROCEDURE — 3046F PR MOST RECENT HEMOGLOBIN A1C LEVEL > 9.0%: ICD-10-PCS | Mod: CPTII,,, | Performed by: NURSE PRACTITIONER

## 2023-12-29 RX ORDER — TIRZEPATIDE 5 MG/.5ML
5 INJECTION, SOLUTION SUBCUTANEOUS
Qty: 4 PEN | Refills: 1 | Status: SHIPPED | OUTPATIENT
Start: 2024-01-29

## 2023-12-29 RX ORDER — TIRZEPATIDE 2.5 MG/.5ML
2.5 INJECTION, SOLUTION SUBCUTANEOUS
Qty: 4 PEN | Refills: 0 | Status: SHIPPED | OUTPATIENT
Start: 2023-12-29

## 2023-12-29 RX ORDER — TIRZEPATIDE 2.5 MG/.5ML
2.5 INJECTION, SOLUTION SUBCUTANEOUS
Qty: 4 PEN | Refills: 2 | Status: SHIPPED | OUTPATIENT
Start: 2023-12-29 | End: 2023-12-29

## 2023-12-29 NOTE — PROGRESS NOTES
Patient Name: Arnoldo Crane Jr.   : 1980  MRN: [id number]     SUBJECTIVE DATA:    CHIEF COMPLAINT:   Arnoldo Crane Jr. is a 43 y.o. male who presents to clinic today with Diabetes      HPI :  43-year-old male presents to the clinic to follow-up on diabetes and hypertension.    Patient denies chest pain, shortness of breath, dyspnea on exertion, palpitations, peripheral edema, abdominal pain, nausea, vomiting, diarrhea, constipation, fatigue, fever, chills, dysuria,  hematuria, melena, or hematochezia.       ALLERGIES:   Review of patient's allergies indicates:   Allergen Reactions    Metformin Rash         ROS:  Review of Systems   All other systems reviewed and are negative.        OBJECTIVE DATA:  Vital signs  Vitals:    23 1007   BP: 122/85   Pulse: 73   Resp: 18   Temp: 97.7 °F (36.5 °C)   TempSrc: Oral   SpO2: 99%   Weight: (!) 153.3 kg (338 lb)   Height: 6' (1.829 m)      Body mass index is 45.84 kg/m².    PHYSICAL EXAM:   Physical Exam  Vitals and nursing note reviewed.   Constitutional:       General: He is awake. He is not in acute distress.     Appearance: Normal appearance. He is well-developed and well-groomed. He is morbidly obese. He is not ill-appearing, toxic-appearing or diaphoretic.   HENT:      Head: Normocephalic and atraumatic.      Right Ear: Tympanic membrane, ear canal and external ear normal.      Left Ear: Tympanic membrane, ear canal and external ear normal.      Nose: Nose normal.      Mouth/Throat:      Mouth: Mucous membranes are moist.      Pharynx: Oropharynx is clear. Uvula midline.   Eyes:      General: Lids are normal.      Extraocular Movements: Extraocular movements intact.      Conjunctiva/sclera: Conjunctivae normal.      Pupils: Pupils are equal, round, and reactive to light.   Neck:      Trachea: Trachea and phonation normal.   Cardiovascular:      Rate and Rhythm: Normal rate and regular rhythm.      Pulses: Normal pulses.           Radial pulses are  2+ on the right side and 2+ on the left side.      Heart sounds: Normal heart sounds. No murmur heard.  Pulmonary:      Effort: Pulmonary effort is normal.      Breath sounds: Normal breath sounds and air entry. No wheezing or rhonchi.   Abdominal:      Palpations: Abdomen is soft.   Musculoskeletal:         General: Normal range of motion.      Cervical back: Normal range of motion and neck supple. No rigidity or tenderness.      Right lower leg: No edema.      Left lower leg: No edema.   Lymphadenopathy:      Cervical: No cervical adenopathy.   Skin:     General: Skin is warm.      Capillary Refill: Capillary refill takes less than 2 seconds.   Neurological:      General: No focal deficit present.      Mental Status: He is alert and oriented to person, place, and time. Mental status is at baseline.      GCS: GCS eye subscore is 4. GCS verbal subscore is 5. GCS motor subscore is 6.      Cranial Nerves: No cranial nerve deficit.      Sensory: No sensory deficit.      Motor: No weakness.      Coordination: Coordination normal.      Gait: Gait normal.   Psychiatric:         Attention and Perception: Attention normal.         Mood and Affect: Mood and affect normal.         Speech: Speech normal.         Behavior: Behavior normal. Behavior is cooperative.         Thought Content: Thought content normal.         Cognition and Memory: Cognition and memory normal.         Judgment: Judgment normal.          ASSESSMENT/PLAN:  1. Type 2 diabetes mellitus with hyperglycemia, without long-term current use of insulin  Assessment & Plan:  Patient is here for diabetes follow-up.  Blood sugar in clinic Cbg 222.   Patient state stopped metformin due to rash.  After stopping metformin for 14 days rash resolved.  Hemoglobin A1c 11.5%.  Previously was on Ozempic, unable to tolerate.  Patient state he is tolerating glipizide 10 mg p.o. once daily with breakfast.  Discussed with patient to start metformin.  Will add Jardiance 25 mg  p.o. tablet daily.  Will add Mounjaro 2.5 mg p.o. subQ weekly for 4 weeks and then will increase to 5 mg every 7 days subcu for 8 weeks.  Continue to follow diabetic diet meal planning.  Continue to follow low-salt diet less than 2 g per day if possible.  Stay physically active at least 30 minutes a day up to 5 days a week as simple as brisk walking.  Stay hydrated with water.  Questions solicited and answered, patient verbalized and agreed to plan.    Orders:  -     POCT Glucose, Hand-Held Device  -     empagliflozin (JARDIANCE) 25 mg tablet; Take 1 tablet (25 mg total) by mouth once daily.  Dispense: 90 tablet; Refill: 3  -     Discontinue: tirzepatide (MOUNJARO) 2.5 mg/0.5 mL PnIj; Inject 2.5 mg into the skin every 7 days.  Dispense: 4 Pen; Refill: 2  -     Ambulatory referral/consult to Diabetes Education; Future; Expected date: 01/05/2024  -     tirzepatide (MOUNJARO) 2.5 mg/0.5 mL PnIj; Inject 2.5 mg into the skin every 7 days.  Dispense: 4 Pen; Refill: 0  -     tirzepatide (MOUNJARO) 5 mg/0.5 mL PnIj; Inject 5 mg into the skin every 7 days.  Dispense: 4 pen ; Refill: 1    2. Other secondary hypertension  Assessment & Plan:  Blood pressure controlled.  Current blood pressure 122/85.  Patient tolerating Diovan 40 mg p.o. once daily.  Patient will continue to take Diovan 40 mg p.o. once daily continue to follow low-salt diet.  Stay hydrated with water.  Lose weight.  Follow discharge instructions.  Questions solicited and answered, patient verbalized and agreed to plan.             RESULTS:  Recent Results (from the past 1008 hour(s))   POCT Glucose, Hand-Held Device    Collection Time: 12/29/23 10:31 AM   Result Value Ref Range    POC Glucose 222 (A) 70 - 110 MG/DL         Follow Up:  Follow up in about 3 months (around 3/29/2024).      Previous medical history/lab work/radiology reviewed and considered during medical management decisions.   Medication list reviewed and medication reconciliation  performed.  Patient was provided  and care about his/her current diagnosis (es) and medications including risk/benefit and side effects/adverse events, over the counter medication uses/doses, home self-care and contact precautions,  and red flags and indications for when to seek immediate medical attention.   Patient was advised to continue compliance with current medication list and medical recommendations.  Patient dvised continued compliance with recommended eating habits/ diets for medical conditions and exercise 150 minutes/ week (if possible) for medical condition (s).  Educational handouts and instructions on selected disease management in AVS (After Visit Summary).    All of the patient's questions were answered to patient's satisfaction.   The patient was receptive, expressed verbal understanding and agreement the above plan.     This note was created with the assistance of a voice recognition software or phone dictation. There may be transcription errors as a result of using this technology however minimal. Effort has been made to assure accuracy of transcription but any obvious errors or omissions should be clarified with the author of the document

## 2023-12-29 NOTE — PATIENT INSTRUCTIONS
Jaspreet Mclaughlin,     If you are due for any health screening(s) below please notify me so we can arrange them to be ordered and scheduled. Most healthy patients at your age complete them, but you are free to accept or refuse.     If you can't do it, I'll definitely understand. If you can, I'd certainly appreciate it!    Tests to Keep You Healthy    Eye Exam: Met on 10/11/2023  Last Blood Pressure <= 139/89 (12/29/2023): Yes  Last HbA1c < 8 (11/06/2023): NO      Lets manage your A1c levels     Your last hemoglobin A1c was higher than the goal of less than 8. Hemoglobin A1c or HbA1c is a blood test that measures your average blood sugar levels over the past 3 months. It is the main test to help you and your health care team manage your diabetes.     Higher A1c levels are linked to diabetes complications, such as loss of vision, kidney disease, and nerve damage. Keeping your A1c at least less than 8 is important to stay healthy and we are here to help. Talk with your provider on how you can further improve your A1c.     We recommend that you set up blood work to get a repeat hemoglobin A1c in 3 months to monitor your diabetes. Let your health care team know if you have questions.

## 2023-12-29 NOTE — ASSESSMENT & PLAN NOTE
Blood pressure controlled.  Current blood pressure 122/85.  Patient tolerating Diovan 40 mg p.o. once daily.  Patient will continue to take Diovan 40 mg p.o. once daily continue to follow low-salt diet.  Stay hydrated with water.  Lose weight.  Follow discharge instructions.  Questions solicited and answered, patient verbalized and agreed to plan.

## 2023-12-29 NOTE — ASSESSMENT & PLAN NOTE
Patient is here for diabetes follow-up.  Blood sugar in clinic Cbg 222.   Patient state stopped metformin due to rash.  After stopping metformin for 14 days rash resolved.  Hemoglobin A1c 11.5%.  Previously was on Ozempic, unable to tolerate.  Patient state he is tolerating glipizide 10 mg p.o. once daily with breakfast.  Discussed with patient to start metformin.  Will add Jardiance 25 mg p.o. tablet daily.  Will add Mounjaro 2.5 mg p.o. subQ weekly for 4 weeks and then will increase to 5 mg every 7 days subcu for 8 weeks.  Continue to follow diabetic diet meal planning.  Continue to follow low-salt diet less than 2 g per day if possible.  Stay physically active at least 30 minutes a day up to 5 days a week as simple as brisk walking.  Stay hydrated with water.  Questions solicited and answered, patient verbalized and agreed to plan.

## 2024-01-01 PROBLEM — E11.10 DKA (DIABETIC KETOACIDOSIS): Status: RESOLVED | Noted: 2023-09-29 | Resolved: 2024-01-01

## 2024-01-02 ENCOUNTER — PATIENT MESSAGE (OUTPATIENT)
Dept: FAMILY MEDICINE | Facility: CLINIC | Age: 44
End: 2024-01-02
Payer: MEDICAID

## 2024-02-21 ENCOUNTER — OFFICE VISIT (OUTPATIENT)
Dept: BEHAVIORAL HEALTH | Facility: CLINIC | Age: 44
End: 2024-02-21
Payer: MEDICAID

## 2024-02-21 VITALS
HEART RATE: 76 BPM | TEMPERATURE: 99 F | HEIGHT: 72 IN | BODY MASS INDEX: 42.66 KG/M2 | DIASTOLIC BLOOD PRESSURE: 84 MMHG | SYSTOLIC BLOOD PRESSURE: 112 MMHG | WEIGHT: 315 LBS

## 2024-02-21 DIAGNOSIS — F33.1 MODERATE EPISODE OF RECURRENT MAJOR DEPRESSIVE DISORDER: Primary | Chronic | ICD-10-CM

## 2024-02-21 DIAGNOSIS — F51.04 PSYCHOPHYSIOLOGICAL INSOMNIA: ICD-10-CM

## 2024-02-21 DIAGNOSIS — F41.1 GAD (GENERALIZED ANXIETY DISORDER): Chronic | ICD-10-CM

## 2024-02-21 PROCEDURE — 3008F BODY MASS INDEX DOCD: CPT | Mod: CPTII,,, | Performed by: NURSE PRACTITIONER

## 2024-02-21 PROCEDURE — 4010F ACE/ARB THERAPY RXD/TAKEN: CPT | Mod: CPTII,,, | Performed by: NURSE PRACTITIONER

## 2024-02-21 PROCEDURE — 3079F DIAST BP 80-89 MM HG: CPT | Mod: CPTII,,, | Performed by: NURSE PRACTITIONER

## 2024-02-21 PROCEDURE — 3074F SYST BP LT 130 MM HG: CPT | Mod: CPTII,,, | Performed by: NURSE PRACTITIONER

## 2024-02-21 PROCEDURE — 1159F MED LIST DOCD IN RCRD: CPT | Mod: CPTII,,, | Performed by: NURSE PRACTITIONER

## 2024-02-21 PROCEDURE — 99212 OFFICE O/P EST SF 10 MIN: CPT | Mod: S$PBB,,, | Performed by: NURSE PRACTITIONER

## 2024-02-21 PROCEDURE — 1160F RVW MEDS BY RX/DR IN RCRD: CPT | Mod: CPTII,,, | Performed by: NURSE PRACTITIONER

## 2024-02-21 PROCEDURE — 99213 OFFICE O/P EST LOW 20 MIN: CPT | Mod: PBBFAC,PN | Performed by: NURSE PRACTITIONER

## 2024-02-21 RX ORDER — BUSPIRONE HYDROCHLORIDE 10 MG/1
10 TABLET ORAL 2 TIMES DAILY
Qty: 180 TABLET | Refills: 1 | Status: SHIPPED | OUTPATIENT
Start: 2024-02-21

## 2024-02-21 RX ORDER — DULOXETIN HYDROCHLORIDE 60 MG/1
60 CAPSULE, DELAYED RELEASE ORAL DAILY
Qty: 90 CAPSULE | Refills: 1 | Status: SHIPPED | OUTPATIENT
Start: 2024-02-21

## 2024-02-21 RX ORDER — ZOLPIDEM TARTRATE 12.5 MG/1
12.5 TABLET, FILM COATED, EXTENDED RELEASE ORAL NIGHTLY PRN
Qty: 30 TABLET | Refills: 5 | Status: SHIPPED | OUTPATIENT
Start: 2024-02-21

## 2024-02-21 NOTE — PROGRESS NOTES
Follow up #1  12/20/2023  HPI: Arnoldo Guthrieelizabeth Lepe. is a 43 y.o. male here today for a psychiatric evaluation referred by PCP to the [facility] for anxiety and depression  Past Medical History: Anxiety disorder, unspecified, Depression, Insomnia, Morbidly obese, Type 2 diabetes mellitus without complications     On his last visit, patient stated that he was doing much better on Cymbalta than he was on Zoloft. The Cymbalta was increased to 60mg a day.     Today, patient states that overall, he is still doing better on the Cymbalta than the Zoloft but he cannot tell any difference on a higher dose. He does not feel the need for an increase in the dose.     He is still sleeping well with Ambien. He denies sleep walking.   Has not yet had a sleep study.    FU in 6 months    PHQ Score:   02/21/2024: 1  12/20/2023: 2   10/18/2023: 17 moderate    DOUG-7 Score:   02/21/2024: 0  12/20/2023: 0  10/18/2023: 17 severe    Mental Status Evaluation:  Appearance:  unremarkable, age appropriate   Behavior:  normal, cooperative   Speech:  no latency; no press   Mood:  euthymic   Affect:  congruent and appropriate   Thought Process:  normal and logical   Thought Content:  normal, no suicidality, no homicidality, delusions, or paranoia   Sensorium:  grossly intact   Cognition:  grossly intact   Insight:  intact   Judgment:  behavior is adequate to circumstances     Impression:  Generalized anxiety disorder  2. Major Depression Disorder recurrent moderate  3. Insomnia     Plan:  Continue  Cymbalta  60 mg   2. Increase Buspar to 10mg BID or TID  3. Continue Ambien CR 12.5mg at HS  3. Follow-up in 6 months    Follow up #1  12/20/2023  HPI: Arnoldo Del Realbarbara Crane Jr. is a 43 y.o. male here today for a psychiatric evaluation referred by PCP to the [facility] for anxiety and depression  Past Medical History: Anxiety disorder, unspecified, Depression, Insomnia, Morbidly obese, Type 2 diabetes mellitus without complications     On  "his last visit, he was to taper off of Zoloft and start Cymbalta 30mg a day and Buspar BID to TID.     Today, patient states that he is doing much better on Cymbalta than he was on Zoloft. States that even he notices a difference.   He takes the Cymbalta daily and the Buspar twice a day.  Will increase Cymbalta to 60mg a day.   Sleeping well with Ambien. He denies sleep walking.   Was supposed to have a sleep study done 5 weeks ago but he was in too much pain and it has not yet been rescheduled.     PHQ Score:   12/20/2023: 2   10/18/2023: 17 moderate    DOUG-7 Score:   12/20/2023: 0  10/18/2023: 17 severe    Mental Status Evaluation:  Appearance:  unremarkable, age appropriate   Behavior:  normal, cooperative   Speech:  no latency; no press   Mood:  euthymic   Affect:  congruent and appropriate   Thought Process:  normal and logical   Thought Content:  normal, no suicidality, no homicidality, delusions, or paranoia   Sensorium:  grossly intact   Cognition:  grossly intact   Insight:  intact   Judgment:  behavior is adequate to circumstances     Impression:  Generalized anxiety disorder  2. Major Depression Disorder recurrent moderate  3. Insomnia     Plan:  Increase Cymbalta to 60 mg   2. Continue Buspar 5mg BID or TID  3. Continue Ambien CR 12.5mg at HS  3. Follow-up in 8 weeks      Initial Interview  10/18/2023  HPI: Arnoldo Crane  is a 43 y.o. male here today for a psychiatric evaluation referred by PCP to the [facility] for anxiety and depression  Past Medical History: Anxiety disorder, unspecified, Depression, Insomnia, Morbidly obese, Type 2 diabetes mellitus without complications       Patient states, " I have a lot of social anxiety, for the past couple of years. Hard to go, for instance, to the grocery store, I will sit in the parking lot for 10-15 minutes before going in. The same thing happens going to friends for cookouts, will go, and then my mood changes and then I am ready to " "go."    Unsure of any triggers for social anxiety.     Recently, 6 weeks ago, got hurt at work, went to urgent care for treatment, gave shot and back brace (Monday), then 4 days later, went back, received 2 shots and xray, sent home. The next Monday, went to hospital due to increase in pain and weakness, confusion. Found to be in diabetic ketoacidosis.  was hospitalized and placed on insulin drip, but signed himself out the next day AMA.     Works as a , went to  something and felt rip across  back, couldn't hardly move. That day went to the urgent care.  trying to get an MRI pushed through. Unsure if salary will be paid. Knows it will be a long process. Using ice therapy for pain,  does not like pain medication.     Currently taking Zoloft 100 mg daily, has been taking for 6-7 years,  has never been helpful.  placed on it during separation from ex-wife, for depression and anxiety.    When depressed, start thinking about not having income, don't know going to do, and have no answers. Can be at friend's house cooking or having a few drinks and get quiet, want to be alone. "Ghost mode."     Sleep is bad. Takes z quill to go to sleep, may sleep a few hours, wake up, take a dip, play on phone, may be several hours before can fall back asleep.   Interest: Denies   Guilt: Denies   Energy: Poor: Way down right now  Concentration: Not like it used to be, unable to focus  Appetite: Good, normal  Psychomotor: Normal  Suicide: Denies     He has a Rx for Ambien CR 12.5mg at HS that he has not been able to  from the pharmacy. He was told that the medication needed to be re-coded and then sent back to his insurance company.  Patient reports that he has been on Ambien CR 12.5mg in the past. He states that he does not sleep walk or eat while on Ambien.   He reports that he was required by his employer to undergo a sleep study while on Ambien to determine that he would tolerate " the medication and he did so without incidence.     Will also add Buspar 5mg two to three times a day as needed for anxiety.       Medications:   Current Outpatient Medications   Medication Instructions    blood sugar diagnostic Strp To check BG 2 times daily, to use with insurance preferred meter    blood-glucose meter kit To check BG 2 times daily, to use with insurance preferred meter    busPIRone (BUSPAR) 5 mg, Oral, 3 times daily    DULoxetine (CYMBALTA) 30 mg, Oral, Daily    glipiZIDE 5 mg, Oral, With breakfast    lancets Misc To check BG 2 times daily, to use with insurance preferred meter    metFORMIN (GLUCOPHAGE) 500 mg, Oral, 2 times daily with meals    miconazole NITRATE 2 % (ZEASORB AF) 2 % top powder Topical (Top), 2 times daily, And then as needed.  Make sure to apply between toe webs.    rosuvastatin (CRESTOR) 5 mg, Oral, Nightly    zolpidem (AMBIEN CR) 12.5 mg, Oral, Nightly PRN        Psychiatric History:   Reports a prior psychiatric history: Depression Anxiety  History of mental health out-patient treatment: Denies  History of in-patient psychiatric hospitalization: Denies   History of suicidal ideations: denies   History of suicidal threats: Denies   History of suicide attempts: Denies   History of self mutilation: Denies     History of psychotropic medications: Zoloft    Family Psychiatric History:  Mental Illness: Son - mental health diagnosis   Alcohol abuse/addiction: Denies   Drug addiction: Denies     Substance Use History:  Alcohol: drinks 1 time every 3 months, usually 1 glass of whiskey and coke  Marijuana: Denies   Benzodiazepines: Denies   Opiates: Denies   Stimulants:  Cocaine: Denies   Methamphetamine: Denies  Nicotine: Chew, dip, 2 cans a week  Caffeine: Cut out caffeine 4 months ago    Social History:   Grew up in: [place]   Raised by: Mother and Father   Number of siblings: 1 sister   Education: 6th grade, mother held back 2 times in  and 1st grade, then in 6th grade  had appendicitis and was out for an extended period of time for healing  Employment: , out on workman's comp for 6 weeks  Marital Status: Single  Children: 2 boys   Living situation: House  Congregational affiliation: Denies     Trauma History:  History of Emotional/Mental abuse: Denies   History of Physical abuse: Denies   History of Sexual abuse: Denies   History of other trauma: Denies     Legal History:  Legal history: Denies   Denies being on probation or parole Denies   Denies any upcoming court dates Denies   Denies any pending charges. Denies     PHQ Score:   10/18/2023: 17 moderate    DOUG-7 Score:   10/18/2023: 17 severe    Mental Status Evaluation:  Appearance:  unremarkable, age appropriate   Behavior:  normal, cooperative   Speech:  no latency; no press   Mood:  euthymic   Affect:  congruent and appropriate   Thought Process:  normal and logical   Thought Content:  normal, no suicidality, no homicidality, delusions, or paranoia   Sensorium:  grossly intact   Cognition:  grossly intact   Insight:  intact   Judgment:  behavior is adequate to circumstances     Impression:  Generalized anxiety disorder  2. Major Depression Disorder recurrent moderate  3. Insomnia     Plan:  Discontinue Zoloft, taper down to 50 mg x 2 weeks, then discontinue  2. Start Cymbalta 30 mg daily while tapering Zoloft   3. Start Buspar 5mg BID or TID  4. Follow-up in 8 weeks     Follow up in about 8 weeks (around 12/13/2023) for medication management, face to face.

## 2024-04-04 ENCOUNTER — OFFICE VISIT (OUTPATIENT)
Dept: FAMILY MEDICINE | Facility: CLINIC | Age: 44
End: 2024-04-04
Payer: MEDICAID

## 2024-04-04 ENCOUNTER — TELEPHONE (OUTPATIENT)
Dept: FAMILY MEDICINE | Facility: CLINIC | Age: 44
End: 2024-04-04

## 2024-04-04 VITALS
HEIGHT: 72 IN | WEIGHT: 315 LBS | DIASTOLIC BLOOD PRESSURE: 77 MMHG | RESPIRATION RATE: 18 BRPM | BODY MASS INDEX: 42.66 KG/M2 | TEMPERATURE: 98 F | OXYGEN SATURATION: 99 % | SYSTOLIC BLOOD PRESSURE: 124 MMHG | HEART RATE: 73 BPM

## 2024-04-04 DIAGNOSIS — I15.8 OTHER SECONDARY HYPERTENSION: ICD-10-CM

## 2024-04-04 DIAGNOSIS — E11.65 TYPE 2 DIABETES MELLITUS WITH HYPERGLYCEMIA, WITHOUT LONG-TERM CURRENT USE OF INSULIN: Primary | ICD-10-CM

## 2024-04-04 LAB
EST. AVERAGE GLUCOSE BLD GHB EST-MCNC: 116.9 MG/DL
HBA1C MFR BLD: 5.7 %

## 2024-04-04 PROCEDURE — 3074F SYST BP LT 130 MM HG: CPT | Mod: CPTII,,, | Performed by: NURSE PRACTITIONER

## 2024-04-04 PROCEDURE — 3078F DIAST BP <80 MM HG: CPT | Mod: CPTII,,, | Performed by: NURSE PRACTITIONER

## 2024-04-04 PROCEDURE — 3008F BODY MASS INDEX DOCD: CPT | Mod: CPTII,,, | Performed by: NURSE PRACTITIONER

## 2024-04-04 PROCEDURE — 1160F RVW MEDS BY RX/DR IN RCRD: CPT | Mod: CPTII,,, | Performed by: NURSE PRACTITIONER

## 2024-04-04 PROCEDURE — 36415 COLL VENOUS BLD VENIPUNCTURE: CPT | Performed by: NURSE PRACTITIONER

## 2024-04-04 PROCEDURE — 83036 HEMOGLOBIN GLYCOSYLATED A1C: CPT | Performed by: NURSE PRACTITIONER

## 2024-04-04 PROCEDURE — 99215 OFFICE O/P EST HI 40 MIN: CPT | Mod: PBBFAC | Performed by: NURSE PRACTITIONER

## 2024-04-04 PROCEDURE — 4010F ACE/ARB THERAPY RXD/TAKEN: CPT | Mod: CPTII,,, | Performed by: NURSE PRACTITIONER

## 2024-04-04 PROCEDURE — 99214 OFFICE O/P EST MOD 30 MIN: CPT | Mod: S$PBB,,, | Performed by: NURSE PRACTITIONER

## 2024-04-04 PROCEDURE — 1159F MED LIST DOCD IN RCRD: CPT | Mod: CPTII,,, | Performed by: NURSE PRACTITIONER

## 2024-04-04 RX ORDER — METFORMIN HYDROCHLORIDE 1000 MG/1
1000 TABLET ORAL 2 TIMES DAILY
COMMUNITY
Start: 2024-03-04 | End: 2024-04-04

## 2024-04-04 RX ORDER — TIZANIDINE 4 MG/1
4-8 TABLET ORAL NIGHTLY PRN
COMMUNITY
Start: 2023-12-14

## 2024-04-04 NOTE — PROGRESS NOTES
PLEASE CALL PATIENTS WITH RESULT  Hemoglobin A1c 5.7%.  Job well done.  Keep medications as directed.  Keep blood pressure under control.  Good luck with back surgery and get well soon.

## 2024-04-04 NOTE — ASSESSMENT & PLAN NOTE
At goal.  Current blood pressure 124/77.  Patient is tolerating Diovan 40 mg p.o. once daily and would like to continue same dose.

## 2024-04-04 NOTE — PROGRESS NOTES
Patient Name: Arnoldo Crane Jr.   : 1980  MRN: [id number]     SUBJECTIVE DATA:    CHIEF COMPLAINT:   Arnoldo Crane Jr. is a 43 y.o. male who presents to clinic today with Diabetes      HPI:  43-year-old male presents to the clinic to follow-up on diabetes.  Patient state he is tolerating his medication without any side effects.  State his fasting blood glucose is between 70 and 130 and 2 hour post meal has been ranging in the 150 and 130.  Keep up the good work.    2023:    Patient is here for diabetes follow-up.  Blood sugar in clinic [facility] 222.   Patient state stopped metformin due to rash.  After stopping metformin for 14 days rash resolved.  Hemoglobin A1c 11.5%.  Previously was on Ozempic, unable to tolerate.  Patient state he is tolerating glipizide 10 mg p.o. once daily with breakfast.  Discussed with patient to stop metformin.  Will add Jardiance 25 mg p.o. tablet daily.  Will add Mounjaro 2.5 mg p.o. subQ weekly for 4 weeks and then will increase to 5 mg every 7 days subcu for 8 weeks.  Continue to follow diabetic diet meal planning.  Continue to follow low-salt diet less than 2 g per day if possible.  Stay physically active at least 30 minutes a day up to 5 days a week as simple as brisk walking.  Stay hydrated with water.  Questions solicited and answered, patient verbalized and agreed to plan.    2024:  Diabetes: Last hemoglobin A1c 2023 at 11.5%.  Medication was adjusted. State his fasting blood glucose is between 70 and 130 and 2 hour post meal has been ranging in the 150 and 130.  Keep up the good work.  Patient to continue glipizide 10 mg p.o. once daily with breakfast.  Continue Jardiance 25 mg p.o. once daily.  Adjust Mounjaro 7.5 mg subQ weekly.  Continue to follow diabetic diet meal planning  -2 g a day dietary sodium restriction  -optimize glycemic control (goal A1c is less than 7%)  -control high blood pressure (goal blood pressure is less than  130/80)  -exercise at least 30 minutes a day, 5 days a week  -maintain healthy weight  -decrease or stop alcohol use  -do not smoke  -stay well hydrated (drink water only, avoid juices, sweet tea, and sodas)  -ask about staying up-to-date on vaccinations (flu vaccine, pneumonia vaccine, hepatitis B vaccine)  -avoid excessive use of NSAIDs (ibuprofen, naproxen, Aleve, Advil, Toradol, Mobic), take Tylenol as needed for headache or mild pain  -take cholesterol lowering medications if prescribed (LDL goal less than 100).  Questions solicited and answered, patient verbalized and agreed to plan.    04/04/2024 Addendum:  Hemoglobin A1c 5.7%.  Keep up the good work and continue to keep following diabetic diet meal planning and to take medications as discussed.    Hypertension:  At goal.  Current blood pressure 124/77.  Patient is tolerating Diovan 40 mg p.o. once daily and would like to continue same dose.      Patient denies chest pain, shortness of breath, dyspnea on exertion, palpitations, peripheral edema, abdominal pain, nausea, vomiting, diarrhea, constipation, fatigue, fever, chills, dysuria,  hematuria, dark stools or bloody stool.        ALLERGIES: Review of patient's allergies indicates:  No Known Allergies      ROS:  Review of Systems   All other systems reviewed and are negative.        OBJECTIVE DATA:  Vital signs  Vitals:    04/04/24 0732   BP: 124/77   Pulse: 73   Resp: 18   Temp: 98.4 °F (36.9 °C)   TempSrc: Oral   SpO2: 99%   Weight: (!) 144.2 kg (318 lb)   Height: 6' (1.829 m)      Body mass index is 43.13 kg/m².    PHYSICAL EXAM:   Physical Exam  Vitals and nursing note reviewed.   Constitutional:       General: He is awake. He is not in acute distress.     Appearance: Normal appearance. He is well-developed and well-groomed. He is morbidly obese. He is not ill-appearing, toxic-appearing or diaphoretic.   HENT:      Head: Normocephalic and atraumatic.      Right Ear: External ear normal.      Left Ear:  External ear normal.      Nose: Nose normal.      Mouth/Throat:      Mouth: Mucous membranes are moist.      Pharynx: Oropharynx is clear.   Eyes:      Extraocular Movements: Extraocular movements intact.      Conjunctiva/sclera: Conjunctivae normal.      Pupils: Pupils are equal, round, and reactive to light.   Neck:      Trachea: Trachea and phonation normal.   Cardiovascular:      Rate and Rhythm: Normal rate and regular rhythm.      Pulses: Normal pulses.           Radial pulses are 2+ on the right side and 2+ on the left side.      Heart sounds: Normal heart sounds. No murmur heard.  Pulmonary:      Effort: Pulmonary effort is normal.      Breath sounds: Normal breath sounds and air entry. No wheezing or rhonchi.   Abdominal:      Palpations: Abdomen is soft.      Tenderness: There is no abdominal tenderness.   Musculoskeletal:         General: Normal range of motion.      Cervical back: Normal range of motion.   Skin:     General: Skin is warm.      Capillary Refill: Capillary refill takes less than 2 seconds.   Neurological:      General: No focal deficit present.      Mental Status: He is alert and oriented to person, place, and time. Mental status is at baseline.      GCS: GCS eye subscore is 4. GCS verbal subscore is 5. GCS motor subscore is 6.      Cranial Nerves: No cranial nerve deficit.      Sensory: No sensory deficit.      Motor: No weakness.      Coordination: Coordination normal.      Gait: Gait normal.   Psychiatric:         Attention and Perception: Attention and perception normal.         Mood and Affect: Mood and affect normal.         Speech: Speech normal.         Behavior: Behavior normal. Behavior is cooperative.         Thought Content: Thought content normal.         Cognition and Memory: Cognition and memory normal.         Judgment: Judgment normal.          ASSESSMENT/PLAN:  1. Type 2 diabetes mellitus with hyperglycemia, without long-term current use of insulin  Assessment &  Plan:  Last hemoglobin A1c November 6, 2023 at 11.5%.  Medication was adjusted. State his fasting blood glucose is between 70 and 130 and 2 hour post meal has been ranging in the 150 and 130.  Keep up the good work.  Patient to continue glipizide 10 mg p.o. once daily with breakfast.  Continue Jardiance 25 mg p.o. once daily.  Adjust Mounjaro 7.5 mg subQ weekly.  Continue to follow diabetic diet meal planning  -2 g a day dietary sodium restriction  -optimize glycemic control (goal A1c is less than 7%)  -control high blood pressure (goal blood pressure is less than 130/80)  -exercise at least 30 minutes a day, 5 days a week  -maintain healthy weight  -decrease or stop alcohol use  -do not smoke  -stay well hydrated (drink water only, avoid juices, sweet tea, and sodas)  -ask about staying up-to-date on vaccinations (flu vaccine, pneumonia vaccine, hepatitis B vaccine)  -avoid excessive use of NSAIDs (ibuprofen, naproxen, Aleve, Advil, Toradol, Mobic), take Tylenol as needed for headache or mild pain  -take cholesterol lowering medications if prescribed (LDL goal less than 100).  Questions solicited and answered, patient verbalized and agreed to plan.  04/04/2024 Addendum:  Hemoglobin A1c 5.7%.  Keep up the good work and continue to keep following diabetic diet meal planning and to take medications as discussed.    Orders:  -     Hemoglobin A1C  -     tirzepatide 7.5 mg/0.5 mL PnIj; Inject 7.5 mg into the skin every 7 days.  Dispense: 0.5 Pen; Refill: 2    2. Other secondary hypertension  Assessment & Plan:  At goal.  Current blood pressure 124/77.  Patient is tolerating Diovan 40 mg p.o. once daily and would like to continue same dose.               RESULTS:  Recent Results (from the past 1008 hour(s))   Hemoglobin A1C    Collection Time: 04/04/24  7:52 AM   Result Value Ref Range    Hemoglobin A1c 5.7 <=7.0 %    Estimated Average Glucose 116.9 mg/dL         Follow Up:  Follow up in about 3 months (around 7/9/2024).       Previous medical history/lab work/radiology reviewed and considered during medical management decisions.   Medication list reviewed and medication reconciliation performed.  Patient was provided  and care about his/her current diagnosis (es) and medications including risk/benefit and side effects/adverse events, over the counter medication uses/doses, home self-care and contact precautions,  and red flags and indications for when to seek immediate medical attention.   Patient was advised to continue compliance with current medication list and medical recommendations.  Patient dvised continued compliance with recommended eating habits/ diets for medical conditions and exercise 150 minutes/ week (if possible) for medical condition (s).  Educational handouts and instructions on selected disease management in AVS (After Visit Summary).    All of the patient's questions were answered to patient's satisfaction.   The patient was receptive, expressed verbal understanding and agreement the above plan.        This note was created with the assistance of a voice recognition software or phone dictation. There may be transcription errors as a result of using this technology however minimal. Effort has been made to assure accuracy of transcription but any obvious errors or omissions should be clarified with the author of the document

## 2024-04-04 NOTE — ASSESSMENT & PLAN NOTE
Last hemoglobin A1c November 6, 2023 at 11.5%.  Medication was adjusted. State his fasting blood glucose is between 70 and 130 and 2 hour post meal has been ranging in the 150 and 130.  Keep up the good work.  Patient to continue glipizide 10 mg p.o. once daily with breakfast.  Continue Jardiance 25 mg p.o. once daily.  Adjust Mounjaro 7.5 mg subQ weekly.  Continue to follow diabetic diet meal planning  -2 g a day dietary sodium restriction  -optimize glycemic control (goal A1c is less than 7%)  -control high blood pressure (goal blood pressure is less than 130/80)  -exercise at least 30 minutes a day, 5 days a week  -maintain healthy weight  -decrease or stop alcohol use  -do not smoke  -stay well hydrated (drink water only, avoid juices, sweet tea, and sodas)  -ask about staying up-to-date on vaccinations (flu vaccine, pneumonia vaccine, hepatitis B vaccine)  -avoid excessive use of NSAIDs (ibuprofen, naproxen, Aleve, Advil, Toradol, Mobic), take Tylenol as needed for headache or mild pain  -take cholesterol lowering medications if prescribed (LDL goal less than 100).  Questions solicited and answered, patient verbalized and agreed to plan.  04/04/2024 Addendum:  Hemoglobin A1c 5.7%.  Keep up the good work and continue to keep following diabetic diet meal planning and to take medications as discussed.

## 2024-04-04 NOTE — TELEPHONE ENCOUNTER
Called patient to give results. Patient verbalized understanding. No additional questions at this time.   ----- Message from KASANDRA Dexter sent at 4/4/2024  2:20 PM CDT -----  PLEASE CALL PATIENTS WITH RESULT  Hemoglobin A1c 5.7%.  Job well done.  Keep medications as directed.  Keep blood pressure under control.  Good luck with back surgery and get well soon.

## 2024-07-01 DIAGNOSIS — E11.65 TYPE 2 DIABETES MELLITUS WITH HYPERGLYCEMIA, WITHOUT LONG-TERM CURRENT USE OF INSULIN: ICD-10-CM

## 2024-07-01 RX ORDER — TIRZEPATIDE 7.5 MG/.5ML
INJECTION, SOLUTION SUBCUTANEOUS
Qty: 2 ML | Refills: 0 | Status: SHIPPED | OUTPATIENT
Start: 2024-07-01

## 2024-07-15 ENCOUNTER — OFFICE VISIT (OUTPATIENT)
Dept: FAMILY MEDICINE | Facility: CLINIC | Age: 44
End: 2024-07-15
Payer: MEDICAID

## 2024-07-15 VITALS
OXYGEN SATURATION: 100 % | BODY MASS INDEX: 40.23 KG/M2 | RESPIRATION RATE: 18 BRPM | HEART RATE: 60 BPM | SYSTOLIC BLOOD PRESSURE: 123 MMHG | DIASTOLIC BLOOD PRESSURE: 86 MMHG | HEIGHT: 72 IN | WEIGHT: 297 LBS | TEMPERATURE: 98 F

## 2024-07-15 DIAGNOSIS — K21.9 GASTROESOPHAGEAL REFLUX DISEASE, UNSPECIFIED WHETHER ESOPHAGITIS PRESENT: ICD-10-CM

## 2024-07-15 DIAGNOSIS — E11.65 TYPE 2 DIABETES MELLITUS WITH HYPERGLYCEMIA, WITHOUT LONG-TERM CURRENT USE OF INSULIN: Primary | ICD-10-CM

## 2024-07-15 LAB
EST. AVERAGE GLUCOSE BLD GHB EST-MCNC: 105.4 MG/DL
HBA1C MFR BLD: 5.3 %

## 2024-07-15 PROCEDURE — 36415 COLL VENOUS BLD VENIPUNCTURE: CPT | Performed by: NURSE PRACTITIONER

## 2024-07-15 PROCEDURE — 99214 OFFICE O/P EST MOD 30 MIN: CPT | Mod: S$PBB,,, | Performed by: NURSE PRACTITIONER

## 2024-07-15 PROCEDURE — 1160F RVW MEDS BY RX/DR IN RCRD: CPT | Mod: CPTII,,, | Performed by: NURSE PRACTITIONER

## 2024-07-15 PROCEDURE — 83036 HEMOGLOBIN GLYCOSYLATED A1C: CPT | Performed by: NURSE PRACTITIONER

## 2024-07-15 PROCEDURE — 3079F DIAST BP 80-89 MM HG: CPT | Mod: CPTII,,, | Performed by: NURSE PRACTITIONER

## 2024-07-15 PROCEDURE — 4010F ACE/ARB THERAPY RXD/TAKEN: CPT | Mod: CPTII,,, | Performed by: NURSE PRACTITIONER

## 2024-07-15 PROCEDURE — 3044F HG A1C LEVEL LT 7.0%: CPT | Mod: CPTII,,, | Performed by: NURSE PRACTITIONER

## 2024-07-15 PROCEDURE — 1159F MED LIST DOCD IN RCRD: CPT | Mod: CPTII,,, | Performed by: NURSE PRACTITIONER

## 2024-07-15 PROCEDURE — 3074F SYST BP LT 130 MM HG: CPT | Mod: CPTII,,, | Performed by: NURSE PRACTITIONER

## 2024-07-15 PROCEDURE — 99215 OFFICE O/P EST HI 40 MIN: CPT | Mod: PBBFAC | Performed by: NURSE PRACTITIONER

## 2024-07-15 PROCEDURE — 3008F BODY MASS INDEX DOCD: CPT | Mod: CPTII,,, | Performed by: NURSE PRACTITIONER

## 2024-07-15 RX ORDER — OMEPRAZOLE 40 MG/1
40 CAPSULE, DELAYED RELEASE ORAL DAILY
Qty: 90 CAPSULE | Refills: 3 | Status: SHIPPED | OUTPATIENT
Start: 2024-07-15 | End: 2025-07-15

## 2024-07-15 NOTE — PATIENT INSTRUCTIONS
Jaspreet Mclaughlin,     If you are due for any health screening(s) below please notify me so we can arrange them to be ordered and scheduled. Most healthy patients at your age complete them, but you are free to accept or refuse.     If you can't do it, I'll definitely understand. If you can, I'd certainly appreciate it!    All of your core healthy metrics are met.

## 2024-07-15 NOTE — PROGRESS NOTES
Patient Name: Arnoldo Crane Jr.   : 1980  MRN: [id number]     SUBJECTIVE DATA:    CHIEF COMPLAINT:   Arnoldo Crane Jr. is a 44 y.o. male who presents to clinic today with Diabetes        HPI:  44-year-old male presents to the clinic to follow-up on diabetes.  Past medical history depression, anxiety, hypertension, diabetes type 2.    Diabetes:  Hemoglobin A1c on 2024 5.7%.  Patient state he is compliant with Mounjaro 7.5 mg subQ once weekly.  Patient state he stopped taking Jardiance and glipizide.  The reason he stopped taking because his blood sugar was dropping.  Advise patient glipizide can be the cause.  Will stop glipizide but we need to continue with Jardiance 25 mg p.o. once daily for kidney protection.  Patient's last visit weight on 2024 was 318 lb and currently he is 297 lb.  Patient state he was approved for lumbar spine fusion L4-L5 with Dr. Angelique rahman on at [facility].  Patient state he needs to lose weight before surgery as well he needs to quit smoking.  Recommended smoke cessation, patient declined because he state he can do it on his own.  Regarding weight management, patient unable to exercise effectively due to lower back pain and leg tingling.  Discussed calorie restriction 1500 calories a day which should help him lose weight and also he has not been exercising so he does not need high calorie intake.  Patient to read discharge education materials.  Discussed with patient will increase Mounjaro to 10 mg subQ every 7 days.  Restart Jardiance 25 mg p.o. once daily.  Hemoglobin A1c pending.  Will notify patient of test results when it becomes available.  Return to clinic in 3 months for diabetes follow-up.  Return to clinic sooner if needed.    Acid reflux:  Patient state he has history of acid reflux and requesting medication.  Discussed GERD diet, lifestyle modifications with healthy eating habits.  Depression fruits and vegetables, keep in  mind serving size.  Read discharge education materials as discussed under diabetes.  Quit smoking.  Stop alcohol if you drink.  Rx omeprazole 40 mg p.o. daily on empty stomach 30 minutes before 1st meal.  Questions solicited and answered, patient verbalized understanding.    Patient denies chest pain, shortness of breath, dyspnea on exertion, palpitations, peripheral edema, abdominal pain, nausea, vomiting, diarrhea, constipation, fatigue, fever, chills, dysuria,  hematuria, dark stools or bloody stools        ALLERGIES:   Review of patient's allergies indicates:   Allergen Reactions    Metformin Rash         ROS:  Review of Systems   Gastrointestinal:  Positive for heartburn.   All other systems reviewed and are negative.        OBJECTIVE DATA:  Vital signs  Vitals:    07/15/24 1254   BP: 123/86   Pulse: 60   Resp: 18   Temp: 97.9 °F (36.6 °C)   TempSrc: Oral   SpO2: 100%   Weight: 134.7 kg (297 lb)   Height: 6' (1.829 m)      Body mass index is 40.28 kg/m².    PHYSICAL EXAM:   Physical Exam  Vitals and nursing note reviewed.   Constitutional:       General: He is awake. He is not in acute distress.     Appearance: Normal appearance. He is well-developed and well-groomed. He is morbidly obese. He is not ill-appearing, toxic-appearing or diaphoretic.   HENT:      Head: Normocephalic and atraumatic.      Right Ear: External ear normal.      Left Ear: External ear normal.      Nose: Nose normal.      Mouth/Throat:      Lips: Pink.      Mouth: Mucous membranes are moist.      Tongue: Tongue does not deviate from midline.      Palate: No lesions.      Pharynx: Oropharynx is clear. Uvula midline.   Eyes:      General: Lids are normal. Gaze aligned appropriately.      Extraocular Movements: Extraocular movements intact.      Pupils: Pupils are equal, round, and reactive to light.      Comments: Vision Screening  Right eye - Without correction: 20/25  With correction:   Left eye - Without correction: 20/25  With  correction:   Both eyes - Without correction: 20/25  With correction:     Ophthalmology referral initiated.   Neck:      Trachea: Trachea and phonation normal.   Cardiovascular:      Rate and Rhythm: Normal rate and regular rhythm.      Pulses: Normal pulses.           Radial pulses are 2+ on the right side and 2+ on the left side.      Heart sounds: Normal heart sounds. No murmur heard.  Pulmonary:      Effort: Pulmonary effort is normal.      Breath sounds: Normal breath sounds and air entry. No wheezing or rhonchi.   Abdominal:      General: Abdomen is flat. There is no distension.      Palpations: Abdomen is soft. There is no mass.      Tenderness: There is no abdominal tenderness. There is no right CVA tenderness, left CVA tenderness, guarding or rebound.      Hernia: No hernia is present.   Musculoskeletal:         General: Normal range of motion.      Cervical back: Normal range of motion and neck supple.      Right lower leg: No edema.      Left lower leg: No edema.   Skin:     General: Skin is warm.      Capillary Refill: Capillary refill takes less than 2 seconds.   Neurological:      General: No focal deficit present.      Mental Status: He is alert and oriented to person, place, and time. Mental status is at baseline.      GCS: GCS eye subscore is 4. GCS verbal subscore is 5. GCS motor subscore is 6.      Cranial Nerves: No cranial nerve deficit.      Sensory: No sensory deficit.      Motor: No weakness.      Coordination: Coordination normal.      Gait: Gait normal.   Psychiatric:         Attention and Perception: Attention and perception normal.         Mood and Affect: Mood and affect normal.         Speech: Speech normal.         Behavior: Behavior normal. Behavior is cooperative.         Thought Content: Thought content normal.         Cognition and Memory: Cognition and memory normal.         Judgment: Judgment normal.          ASSESSMENT/PLAN:  1. Type 2 diabetes mellitus with hyperglycemia,  without long-term current use of insulin  Assessment & Plan:  Hemoglobin A1c on April 4, 2024 5.7%.  Patient state he is compliant with Mounjaro 7.5 mg subQ once weekly.  Patient state he stopped taking Jardiance and glipizide.  The reason he stopped taking because his blood sugar was dropping.  Advise patient glipizide can be the cause.  Will stop glipizide but we need to continue with Jardiance 25 mg p.o. once daily for kidney protection.  Patient's last visit weight on April 4, 2024 was 318 lb and currently he is 297 lb.  Patient state he was approved for lumbar spine fusion L4-L5 with Dr. Merino her on at [facility].  Patient state he needs to lose weight before surgery as well he needs to quit smoking.  Recommended smoke cessation, patient declined because he state he can do it on his own.  Regarding weight management, patient unable to exercise effectively due to lower back pain and leg tingling.  Discussed calorie restriction 1500 calories a day which should help him lose weight and also he has not been exercising so he does not need high calorie intake.  Patient to read discharge education materials.  Discussed with patient will increase Mounjaro to 10 mg subQ every 7 days.  Restart Jardiance 25 mg p.o. once daily.  Hemoglobin A1c pending.  Will notify patient of test results when it becomes available.  Return to clinic in 3 months for diabetes follow-up.  Return to clinic sooner if needed.    Orders:  -     Hemoglobin A1C  -     Ambulatory referral/consult to Ophthalmology; Future; Expected date: 07/22/2024  -     tirzepatide 10 mg/0.5 mL PnIj; Inject 10 mg into the skin every 7 days.  Dispense: 4 Pen; Refill: 3    2. Gastroesophageal reflux disease, unspecified whether esophagitis present  Assessment & Plan:  Patient state he has history of acid reflux and requesting medication.  Discussed GERD diet, lifestyle modifications with healthy eating habits.  Depression fruits and vegetables,  keep in mind serving size.  Read discharge education materials as discussed under diabetes.  Quit smoking.  Stop alcohol if you drink.  Rx omeprazole 40 mg p.o. daily on empty stomach 30 minutes before 1st meal.  Questions solicited and answered, patient verbalized understanding.      Orders:  -     omeprazole (PRILOSEC) 40 MG capsule; Take 1 capsule (40 mg total) by mouth once daily.  Dispense: 90 capsule; Refill: 3           RESULTS:  No results found for this or any previous visit (from the past 1008 hour(s)).      Follow Up:  Follow up in about 3 months (around 10/17/2024).     32 minutes of total time spent on the encounter, which includes face to face time and non-face to face time preparing to see the patient (eg, review of tests), Obtaining and/or reviewing separately obtained history, Documenting clinical information in the electronic or other health record, Independently interpreting results (not separately reported) and communicating results to the patient/family/caregiver, or Care coordination (not separately reported).      Previous medical history/lab work/radiology reviewed and considered during medical management decisions.   Medication list reviewed and medication reconciliation performed.  Patient was provided  and care about his/her current diagnosis (es) and medications including risk/benefit and side effects/adverse events, over the counter medication uses/doses, home self-care and contact precautions,  and red flags and indications for when to seek immediate medical attention.   Patient was advised to continue compliance with current medication list and medical recommendations.  Patient dvised continued compliance with recommended eating habits/ diets for medical conditions and exercise 150 minutes/ week (if possible) for medical condition (s).  Educational handouts and instructions on selected disease management in AVS (After Visit Summary).    All of the patient's questions were  answered to patient's satisfaction.   The patient was receptive, expressed verbal understanding and agreement the above plan.        This note was created with the assistance of a voice recognition software or phone dictation. There may be transcription errors as a result of using this technology however minimal. Effort has been made to assure accuracy of transcription but any obvious errors or omissions should be clarified with the author of the document

## 2024-07-15 NOTE — ASSESSMENT & PLAN NOTE
Patient state he has history of acid reflux and requesting medication.  Discussed GERD diet, lifestyle modifications with healthy eating habits.  Depression fruits and vegetables, keep in mind serving size.  Read discharge education materials as discussed under diabetes.  Quit smoking.  Stop alcohol if you drink.  Rx omeprazole 40 mg p.o. daily on empty stomach 30 minutes before 1st meal.  Questions solicited and answered, patient verbalized understanding.

## 2024-07-15 NOTE — ASSESSMENT & PLAN NOTE
Hemoglobin A1c on April 4, 2024 5.7%.  Patient state he is compliant with Mounjaro 7.5 mg subQ once weekly.  Patient state he stopped taking Jardiance and glipizide.  The reason he stopped taking because his blood sugar was dropping.  Advise patient glipizide can be the cause.  Will stop glipizide but we need to continue with Jardiance 25 mg p.o. once daily for kidney protection.  Patient's last visit weight on April 4, 2024 was 318 lb and currently he is 297 lb.  Patient state he was approved for lumbar spine fusion L4-L5 with Dr. Angelique rahman on at [facility].  Patient state he needs to lose weight before surgery as well he needs to quit smoking.  Recommended smoke cessation, patient declined because he state he can do it on his own.  Regarding weight management, patient unable to exercise effectively due to lower back pain and leg tingling.  Discussed calorie restriction 1500 calories a day which should help him lose weight and also he has not been exercising so he does not need high calorie intake.  Patient to read discharge education materials.  Discussed with patient will increase Mounjaro to 10 mg subQ every 7 days.  Restart Jardiance 25 mg p.o. once daily.  Hemoglobin A1c pending.  Will notify patient of test results when it becomes available.  Return to clinic in 3 months for diabetes follow-up.  Return to clinic sooner if needed.

## 2024-07-16 ENCOUNTER — TELEPHONE (OUTPATIENT)
Dept: FAMILY MEDICINE | Facility: CLINIC | Age: 44
End: 2024-07-16
Payer: MEDICAID

## 2024-07-16 NOTE — TELEPHONE ENCOUNTER
Called patient to give results. Patient verbalized understanding. No additional questions at this time.     ----- Message from KASANDRA Dexter sent at 7/16/2024 10:59 AM CDT -----  PLEASE CALL PATIENTS WITH RESULT  Hemoglobin A1c 5.3%, continue with current medication regiment.

## 2024-08-13 ENCOUNTER — PATIENT MESSAGE (OUTPATIENT)
Dept: FAMILY MEDICINE | Facility: CLINIC | Age: 44
End: 2024-08-13
Payer: MEDICAID

## 2024-08-14 ENCOUNTER — OFFICE VISIT (OUTPATIENT)
Dept: FAMILY MEDICINE | Facility: CLINIC | Age: 44
End: 2024-08-14
Payer: MEDICAID

## 2024-08-14 VITALS
WEIGHT: 299 LBS | DIASTOLIC BLOOD PRESSURE: 83 MMHG | HEIGHT: 72 IN | BODY MASS INDEX: 40.5 KG/M2 | HEART RATE: 101 BPM | OXYGEN SATURATION: 98 % | SYSTOLIC BLOOD PRESSURE: 127 MMHG | TEMPERATURE: 98 F | RESPIRATION RATE: 18 BRPM

## 2024-08-14 DIAGNOSIS — N52.2 DRUG-INDUCED ERECTILE DYSFUNCTION: Primary | ICD-10-CM

## 2024-08-14 PROCEDURE — 3008F BODY MASS INDEX DOCD: CPT | Mod: CPTII,,, | Performed by: NURSE PRACTITIONER

## 2024-08-14 PROCEDURE — 3074F SYST BP LT 130 MM HG: CPT | Mod: CPTII,,, | Performed by: NURSE PRACTITIONER

## 2024-08-14 PROCEDURE — 1159F MED LIST DOCD IN RCRD: CPT | Mod: CPTII,,, | Performed by: NURSE PRACTITIONER

## 2024-08-14 PROCEDURE — 3044F HG A1C LEVEL LT 7.0%: CPT | Mod: CPTII,,, | Performed by: NURSE PRACTITIONER

## 2024-08-14 PROCEDURE — 99215 OFFICE O/P EST HI 40 MIN: CPT | Mod: PBBFAC | Performed by: NURSE PRACTITIONER

## 2024-08-14 PROCEDURE — 1160F RVW MEDS BY RX/DR IN RCRD: CPT | Mod: CPTII,,, | Performed by: NURSE PRACTITIONER

## 2024-08-14 PROCEDURE — 4010F ACE/ARB THERAPY RXD/TAKEN: CPT | Mod: CPTII,,, | Performed by: NURSE PRACTITIONER

## 2024-08-14 PROCEDURE — 3079F DIAST BP 80-89 MM HG: CPT | Mod: CPTII,,, | Performed by: NURSE PRACTITIONER

## 2024-08-14 PROCEDURE — 99214 OFFICE O/P EST MOD 30 MIN: CPT | Mod: S$PBB,,, | Performed by: NURSE PRACTITIONER

## 2024-08-14 RX ORDER — TADALAFIL 20 MG/1
20 TABLET ORAL
Qty: 30 TABLET | Refills: 2 | Status: SHIPPED | OUTPATIENT
Start: 2024-08-14

## 2024-08-14 NOTE — ASSESSMENT & PLAN NOTE
4-year-old male presents to the clinic requesting medication for erectile dysfunction.  Patient state symptoms has been going on for quite some time.  Patient state he is able to initiate erection but unable to maintain.  Patient is on diabetic medications, hypertension  depression medications.  Stop smoking.  Discussed Rx Cialis 20 mg p.o. 30-60 minutes before sex.  Discussed side effects and usage of medication.  Patient also to read discharge education material on Cialis.  Advise patient to utilize the good Rx coupon provided for best price.  Patient to return to clinic if no improvement.  Questions solicited and answered, patient verbalized and agreed to plan.

## 2024-08-14 NOTE — PROGRESS NOTES
Patient Name: Arnoldo Crane Jr.   : 1980  MRN: [id number]     SUBJECTIVE DATA:    CHIEF COMPLAINT:   Arnoldo Crane Jr. is a 44 y.o. male who presents to clinic today with Erectile Dysfunction        HPI:  44-year-old male presents to the clinic requesting medication for erectile dysfunction.  Patient state symptoms has been going on for quite some time.  Patient state he is able to initiate erection but unable to maintain.  Patient is on diabetic medications, hypertension  depression medications.  Stop smoking.  Discussed Rx Cialis 20 mg p.o. 30-60 minutes before sex.  Discussed side effects and usage of medication.  Patient also to read discharge education material on Cialis.  Advise patient to utilize the good Rx coupon provided for best price.  Patient to return to clinic if no improvement.  Questions solicited and answered, patient verbalized and agreed to plan.    Patient denies chest pain, shortness of breath, dyspnea on exertion, palpitations, peripheral edema, abdominal pain, nausea, vomiting, diarrhea, constipation, fatigue, fever, chills, dysuria,  hematuria, dark stools or bloody stools.      ALLERGIES:   Review of patient's allergies indicates:   Allergen Reactions    Metformin Rash         ROS:  Review of Systems   Genitourinary:         Erectile dysfunction.   All other systems reviewed and are negative.        OBJECTIVE DATA:  Vital signs  Vitals:    24 0839   BP: 127/83   Pulse: 101   Resp: 18   Temp: 98.3 °F (36.8 °C)   TempSrc: Oral   SpO2: 98%   Weight: 135.6 kg (299 lb)   Height: 6' (1.829 m)      Body mass index is 40.55 kg/m².    PHYSICAL EXAM:   Physical Exam  Vitals and nursing note reviewed.   Constitutional:       General: He is awake. He is not in acute distress.     Appearance: Normal appearance. He is well-developed and well-groomed. He is morbidly obese. He is not ill-appearing, toxic-appearing or diaphoretic.   HENT:      Head: Normocephalic and atraumatic.       Right Ear: External ear normal.      Left Ear: External ear normal.      Nose: Nose normal.      Mouth/Throat:      Lips: Pink.      Mouth: Mucous membranes are moist.      Pharynx: Oropharynx is clear. Uvula midline.   Eyes:      General: Lids are normal. No scleral icterus.     Extraocular Movements: Extraocular movements intact.      Pupils: Pupils are equal, round, and reactive to light.   Neck:      Trachea: Trachea and phonation normal.   Cardiovascular:      Rate and Rhythm: Normal rate and regular rhythm.      Pulses: Normal pulses.           Radial pulses are 2+ on the right side and 2+ on the left side.      Heart sounds: Normal heart sounds. No murmur heard.  Pulmonary:      Effort: Pulmonary effort is normal.      Breath sounds: Normal breath sounds. No wheezing or rhonchi.   Abdominal:      General: Abdomen is flat.   Genitourinary:     Comments: Deferred.    Musculoskeletal:         General: Normal range of motion.      Cervical back: Normal range of motion.   Lymphadenopathy:      Cervical: No cervical adenopathy.   Skin:     General: Skin is warm.      Capillary Refill: Capillary refill takes less than 2 seconds.   Neurological:      General: No focal deficit present.      Mental Status: He is alert and oriented to person, place, and time. Mental status is at baseline.      GCS: GCS eye subscore is 4. GCS verbal subscore is 5. GCS motor subscore is 6.      Cranial Nerves: No cranial nerve deficit.      Sensory: No sensory deficit.      Motor: No weakness.      Coordination: Coordination normal.      Gait: Gait normal.   Psychiatric:         Attention and Perception: Attention and perception normal.         Mood and Affect: Mood and affect normal.         Speech: Speech normal.         Behavior: Behavior normal. Behavior is cooperative.         Thought Content: Thought content normal.         Cognition and Memory: Cognition and memory normal.         Judgment: Judgment normal.           ASSESSMENT/PLAN:  1. Drug-induced erectile dysfunction  Assessment & Plan:  4-year-old male presents to the clinic requesting medication for erectile dysfunction.  Patient state symptoms has been going on for quite some time.  Patient state he is able to initiate erection but unable to maintain.  Patient is on diabetic medications, hypertension  depression medications.  Stop smoking.  Discussed Rx Cialis 20 mg p.o. 30-60 minutes before sex.  Discussed side effects and usage of medication.  Patient also to read discharge education material on Cialis.  Advise patient to utilize the good Rx coupon provided for best price.  Patient to return to clinic if no improvement.  Questions solicited and answered, patient verbalized and agreed to plan.    Orders:  -     tadalafiL (CIALIS) 20 MG Tab; Take 1 tablet (20 mg total) by mouth as needed (erectile/sex). 30-60 min before sex.  Dispense: 30 tablet; Refill: 2           RESULTS:  Recent Results (from the past 1008 hour(s))   Hemoglobin A1C    Collection Time: 07/15/24  1:26 PM   Result Value Ref Range    Hemoglobin A1c 5.3 <=7.0 %    Estimated Average Glucose 105.4 mg/dL         Follow Up:  Keep appointment in October as directed.    30minutes of total time spent on the encounter, which includes face to face time and non-face to face time preparing to see the patient (eg, review of tests), Obtaining and/or reviewing separately obtained history, Documenting clinical information in the electronic or other health record, Independently interpreting results (not separately reported) and communicating results to the patient/family/caregiver, or Care coordination (not separately reported).        Previous medical history/lab work/radiology reviewed and considered during medical management decisions.   Medication list reviewed and medication reconciliation performed.  Patient was provided  and care about his/her current diagnosis (es) and medications including risk/benefit  and side effects/adverse events, over the counter medication uses/doses, home self-care and contact precautions,  and red flags and indications for when to seek immediate medical attention.   Patient was advised to continue compliance with current medication list and medical recommendations.  Patient dvised continued compliance with recommended eating habits/ diets for medical conditions and exercise 150 minutes/ week (if possible) for medical condition (s).  Educational handouts and instructions on selected disease management in AVS (After Visit Summary).    All of the patient's questions were answered to patient's satisfaction.   The patient was receptive, expressed verbal understanding and agreement the above plan.      This note was created with the assistance of a voice recognition software or phone dictation. There may be transcription errors as a result of using this technology however minimal. Effort has been made to assure accuracy of transcription but any obvious errors or omissions should be clarified with the author of the document

## 2024-08-21 ENCOUNTER — OFFICE VISIT (OUTPATIENT)
Dept: BEHAVIORAL HEALTH | Facility: CLINIC | Age: 44
End: 2024-08-21
Payer: MEDICAID

## 2024-08-21 VITALS
DIASTOLIC BLOOD PRESSURE: 85 MMHG | WEIGHT: 254.38 LBS | HEART RATE: 67 BPM | SYSTOLIC BLOOD PRESSURE: 126 MMHG | BODY MASS INDEX: 34.45 KG/M2 | OXYGEN SATURATION: 98 % | HEIGHT: 72 IN

## 2024-08-21 DIAGNOSIS — F41.1 GAD (GENERALIZED ANXIETY DISORDER): Chronic | ICD-10-CM

## 2024-08-21 DIAGNOSIS — F33.1 MODERATE EPISODE OF RECURRENT MAJOR DEPRESSIVE DISORDER: Primary | Chronic | ICD-10-CM

## 2024-08-21 DIAGNOSIS — F51.04 PSYCHOPHYSIOLOGICAL INSOMNIA: ICD-10-CM

## 2024-08-21 PROCEDURE — 1159F MED LIST DOCD IN RCRD: CPT | Mod: CPTII,,, | Performed by: NURSE PRACTITIONER

## 2024-08-21 PROCEDURE — 3079F DIAST BP 80-89 MM HG: CPT | Mod: CPTII,,, | Performed by: NURSE PRACTITIONER

## 2024-08-21 PROCEDURE — 4010F ACE/ARB THERAPY RXD/TAKEN: CPT | Mod: CPTII,,, | Performed by: NURSE PRACTITIONER

## 2024-08-21 PROCEDURE — 99213 OFFICE O/P EST LOW 20 MIN: CPT | Mod: PBBFAC,PN | Performed by: NURSE PRACTITIONER

## 2024-08-21 PROCEDURE — 99214 OFFICE O/P EST MOD 30 MIN: CPT | Mod: S$PBB,,, | Performed by: NURSE PRACTITIONER

## 2024-08-21 PROCEDURE — 3044F HG A1C LEVEL LT 7.0%: CPT | Mod: CPTII,,, | Performed by: NURSE PRACTITIONER

## 2024-08-21 PROCEDURE — 3008F BODY MASS INDEX DOCD: CPT | Mod: CPTII,,, | Performed by: NURSE PRACTITIONER

## 2024-08-21 PROCEDURE — 3074F SYST BP LT 130 MM HG: CPT | Mod: CPTII,,, | Performed by: NURSE PRACTITIONER

## 2024-08-21 RX ORDER — DULOXETIN HYDROCHLORIDE 60 MG/1
60 CAPSULE, DELAYED RELEASE ORAL DAILY
Qty: 90 CAPSULE | Refills: 1 | Status: SHIPPED | OUTPATIENT
Start: 2024-08-21

## 2024-08-21 RX ORDER — ZOLPIDEM TARTRATE 12.5 MG/1
12.5 TABLET, FILM COATED, EXTENDED RELEASE ORAL NIGHTLY PRN
Qty: 30 TABLET | Refills: 5 | Status: SHIPPED | OUTPATIENT
Start: 2024-08-21

## 2024-08-21 RX ORDER — BUSPIRONE HYDROCHLORIDE 10 MG/1
10 TABLET ORAL 2 TIMES DAILY
Qty: 180 TABLET | Refills: 1 | Status: SHIPPED | OUTPATIENT
Start: 2024-08-21

## 2024-08-21 NOTE — PROGRESS NOTES
Follow up #3  08/21/2024  HPI: Arnoldo Crane Jr. is a 44 y.o. male here today for a psychiatric evaluation referred by PCP to the [facility] for anxiety and depression  Past Medical History: Anxiety disorder, unspecified, Depression, Insomnia, Morbidly obese, Type 2 diabetes mellitus without complications     On his last visit, patient stated that overall, he was still doing better on the Cymbalta than the Zoloft but could not tell any difference in his mood on the higher dose of Cymbalta. But, he did not feel the need for another increase in the dose. He was sleeping well with Ambien and denied sleep walking. Had not yet had a sleep study.      Today he states that he is doing well. He is still taking the Cymbalta 60mg a day, Buspar 20mg at HS, and Ambien CR 12.5mg at HS. He denies the need for any med changes.  He will have back surgery in October.  He still has not had a sleep study. Is sleeping well on Ambien CR 12.5mg and denies any SE/sleep walking.     FU in 6 months virtual    PHQ Score:   08/21/2024: 2 minimal  02/21/2024: 1  12/20/2023: 2   10/18/2023: 17 moderate    DOUG-7 Score:   08/21/2024: 2 normal  02/21/2024: 0  12/20/2023: 0  10/18/2023: 17 severe    Mental Status Evaluation:  Appearance:  unremarkable, age appropriate   Behavior:  normal, cooperative   Speech:  no latency; no press   Mood:  euthymic   Affect:  congruent and appropriate   Thought Process:  normal and logical   Thought Content:  normal, no suicidality, no homicidality, delusions, or paranoia   Sensorium:  grossly intact   Cognition:  grossly intact   Insight:  intact   Judgment:  behavior is adequate to circumstances     Impression:  Generalized anxiety disorder  2. Major Depression Disorder recurrent moderate  3. Insomnia     Plan:  Continue  Cymbalta  60mg a day  2.  Continue Buspar 20mg at HS  3. Continue Ambien CR 12.5mg at HS  4. Follow-up in 6 months virtual      Follow up #2 02/21/2024  HPI: Arnoldo Crane Jr.  is a 43 y.o. male here today for a psychiatric evaluation referred by PCP to the [facility] for anxiety and depression  Past Medical History: Anxiety disorder, unspecified, Depression, Insomnia, Morbidly obese, Type 2 diabetes mellitus without complications     On his last visit, patient stated that he was doing much better on Cymbalta than he was on Zoloft. The Cymbalta was increased to 60mg a day.     Today, patient states that overall, he is still doing better on the Cymbalta than the Zoloft but he cannot tell any difference on a higher dose. He does not feel the need for an increase in the dose.     He is still sleeping well with Ambien. He denies sleep walking.   Has not yet had a sleep study.    FU in 6 months    PHQ Score:   02/21/2024: 1  12/20/2023: 2   10/18/2023: 17 moderate    DOUG-7 Score:   02/21/2024: 0  12/20/2023: 0  10/18/2023: 17 severe    Mental Status Evaluation:  Appearance:  unremarkable, age appropriate   Behavior:  normal, cooperative   Speech:  no latency; no press   Mood:  euthymic   Affect:  congruent and appropriate   Thought Process:  normal and logical   Thought Content:  normal, no suicidality, no homicidality, delusions, or paranoia   Sensorium:  grossly intact   Cognition:  grossly intact   Insight:  intact   Judgment:  behavior is adequate to circumstances     Impression:  Generalized anxiety disorder  2. Major Depression Disorder recurrent moderate  3. Insomnia     Plan:  Continue  Cymbalta  60 mg   2. Increase Buspar to 10mg BID or TID  3. Continue Ambien CR 12.5mg at HS  3. Follow-up in 6 months    Follow up #1  12/20/2023  HPI: Arnoldo Crane Jr. is a 43 y.o. male here today for a psychiatric evaluation referred by PCP to the [facility] for anxiety and depression  Past Medical History: Anxiety disorder, unspecified, Depression, Insomnia, Morbidly obese, Type 2 diabetes mellitus without complications     On his last visit, he was to taper off of Zoloft and start  "Cymbalta 30mg a day and Buspar BID to TID.     Today, patient states that he is doing much better on Cymbalta than he was on Zoloft. States that even he notices a difference.   He takes the Cymbalta daily and the Buspar twice a day.  Will increase Cymbalta to 60mg a day.   Sleeping well with Ambien. He denies sleep walking.   Was supposed to have a sleep study done 5 weeks ago but he was in too much pain and it has not yet been rescheduled.     PHQ Score:   12/20/2023: 2   10/18/2023: 17 moderate    DOUG-7 Score:   12/20/2023: 0  10/18/2023: 17 severe    Mental Status Evaluation:  Appearance:  unremarkable, age appropriate   Behavior:  normal, cooperative   Speech:  no latency; no press   Mood:  euthymic   Affect:  congruent and appropriate   Thought Process:  normal and logical   Thought Content:  normal, no suicidality, no homicidality, delusions, or paranoia   Sensorium:  grossly intact   Cognition:  grossly intact   Insight:  intact   Judgment:  behavior is adequate to circumstances     Impression:  Generalized anxiety disorder  2. Major Depression Disorder recurrent moderate  3. Insomnia     Plan:  Increase Cymbalta to 60 mg   2. Continue Buspar 5mg BID or TID  3. Continue Ambien CR 12.5mg at HS  3. Follow-up in 8 weeks      Initial Interview  10/18/2023  HPI: Arnoldo Gonzalez Royce Henderson is a 43 y.o. male here today for a psychiatric evaluation referred by PCP to the [facility] for anxiety and depression  Past Medical History: Anxiety disorder, unspecified, Depression, Insomnia, Morbidly obese, Type 2 diabetes mellitus without complications       Patient states, " I have a lot of social anxiety, for the past couple of years. Hard to go, for instance, to the grocery store, I will sit in the parking lot for 10-15 minutes before going in. The same thing happens going to friends for cookouts, will go, and then my mood changes and then I am ready to go."    Unsure of any triggers for social anxiety.     Recently, 6 " "weeks ago, got hurt at work, went to urgent care for treatment, gave shot and back brace (Monday), then 4 days later, went back, received 2 shots and xray, sent home. The next Monday, went to hospital due to increase in pain and weakness, confusion. Found to be in diabetic ketoacidosis.  was hospitalized and placed on insulin drip, but signed himself out the next day AMA.     Works as a , went to  something and felt rip across  back, couldn't hardly move. That day went to the urgent care.  trying to get an MRI pushed through. Unsure if salary will be paid. Knows it will be a long process. Using ice therapy for pain,  does not like pain medication.     Currently taking Zoloft 100 mg daily, has been taking for 6-7 years,  has never been helpful.  placed on it during separation from ex-wife, for depression and anxiety.    When depressed, start thinking about not having income, don't know going to do, and have no answers. Can be at friend's house cooking or having a few drinks and get quiet, want to be alone. "Ghost mode."     Sleep is bad. Takes z quill to go to sleep, may sleep a few hours, wake up, take a dip, play on phone, may be several hours before can fall back asleep.   Interest: Denies   Guilt: Denies   Energy: Poor: Way down right now  Concentration: Not like it used to be, unable to focus  Appetite: Good, normal  Psychomotor: Normal  Suicide: Denies     He has a Rx for Ambien CR 12.5mg at HS that he has not been able to  from the pharmacy. He was told that the medication needed to be re-coded and then sent back to his insurance company.  Patient reports that he has been on Ambien CR 12.5mg in the past. He states that he does not sleep walk or eat while on Ambien.   He reports that he was required by his employer to undergo a sleep study while on Ambien to determine that he would tolerate the medication and he did so without incidence.     Will also add " Buspar 5mg two to three times a day as needed for anxiety.       Medications:   Current Outpatient Medications   Medication Instructions    blood sugar diagnostic Strp To check BG 2 times daily, to use with insurance preferred meter    blood-glucose meter kit To check BG 2 times daily, to use with insurance preferred meter    busPIRone (BUSPAR) 5 mg, Oral, 3 times daily    DULoxetine (CYMBALTA) 30 mg, Oral, Daily    glipiZIDE 5 mg, Oral, With breakfast    lancets Misc To check BG 2 times daily, to use with insurance preferred meter    metFORMIN (GLUCOPHAGE) 500 mg, Oral, 2 times daily with meals    miconazole NITRATE 2 % (ZEASORB AF) 2 % top powder Topical (Top), 2 times daily, And then as needed.  Make sure to apply between toe webs.    rosuvastatin (CRESTOR) 5 mg, Oral, Nightly    zolpidem (AMBIEN CR) 12.5 mg, Oral, Nightly PRN        Psychiatric History:   Reports a prior psychiatric history: Depression Anxiety  History of mental health out-patient treatment: Denies  History of in-patient psychiatric hospitalization: Denies   History of suicidal ideations: denies   History of suicidal threats: Denies   History of suicide attempts: Denies   History of self mutilation: Denies     History of psychotropic medications: Zoloft    Family Psychiatric History:  Mental Illness: Son - mental health diagnosis   Alcohol abuse/addiction: Denies   Drug addiction: Denies     Substance Use History:  Alcohol: drinks 1 time every 3 months, usually 1 glass of whiskey and coke  Marijuana: Denies   Benzodiazepines: Denies   Opiates: Denies   Stimulants:  Cocaine: Denies   Methamphetamine: Denies  Nicotine: Chew, dip, 2 cans a week  Caffeine: Cut out caffeine 4 months ago    Social History:   Grew up in: [place]   Raised by: Mother and Father   Number of siblings: 1 sister   Education: 6th grade, mother held back 2 times in  and 1st grade, then in 6th grade had appendicitis and was out for an extended period of time for  healing  Employment: [employer], out on workman's comp for 6 weeks  Marital Status: Single  Children: 2 boys   Living situation: House  [employer] affiliation: Denies     Trauma History:  History of Emotional/Mental abuse: Denies   History of Physical abuse: Denies   History of Sexual abuse: Denies   History of other trauma: Denies     Legal History:  Legal history: Denies   Denies being on probation or parole Denies   Denies any upcoming court dates Denies   Denies any pending charges. Denies     PHQ Score:   10/18/2023: 17 moderate    DOUG-7 Score:   10/18/2023: 17 severe    Mental Status Evaluation:  Appearance:  unremarkable, age appropriate   Behavior:  normal, cooperative   Speech:  no latency; no press   Mood:  euthymic   Affect:  congruent and appropriate   Thought Process:  normal and logical   Thought Content:  normal, no suicidality, no homicidality, delusions, or paranoia   Sensorium:  grossly intact   Cognition:  grossly intact   Insight:  intact   Judgment:  behavior is adequate to circumstances     Impression:  Generalized anxiety disorder  2. Major Depression Disorder recurrent moderate  3. Insomnia     Plan:  Discontinue Zoloft, taper down to 50 mg x 2 weeks, then discontinue  2. Start Cymbalta 30 mg daily while tapering Zoloft   3. Start Buspar 5mg BID or TID  4. Follow-up in 8 weeks     Follow up in about 8 weeks (around 12/13/2023) for medication management, face to face.

## 2024-08-21 NOTE — PATIENT INSTRUCTIONS
Plan:  Continue  Cymbalta  60mg a day  2.  Continue Buspar 20mg at HS  3. Continue Ambien CR 12.5mg at HS  4. Follow-up in 6 months virtual

## 2024-10-16 ENCOUNTER — CLINICAL SUPPORT (OUTPATIENT)
Dept: FAMILY MEDICINE | Facility: CLINIC | Age: 44
End: 2024-10-16
Attending: NURSE PRACTITIONER
Payer: MEDICAID

## 2024-10-16 ENCOUNTER — OFFICE VISIT (OUTPATIENT)
Dept: FAMILY MEDICINE | Facility: CLINIC | Age: 44
End: 2024-10-16
Payer: MEDICAID

## 2024-10-16 VITALS
SYSTOLIC BLOOD PRESSURE: 130 MMHG | WEIGHT: 315 LBS | TEMPERATURE: 98 F | RESPIRATION RATE: 18 BRPM | BODY MASS INDEX: 42.66 KG/M2 | HEIGHT: 72 IN | HEART RATE: 82 BPM | DIASTOLIC BLOOD PRESSURE: 77 MMHG | OXYGEN SATURATION: 100 %

## 2024-10-16 DIAGNOSIS — E11.65 TYPE 2 DIABETES MELLITUS WITH HYPERGLYCEMIA, WITHOUT LONG-TERM CURRENT USE OF INSULIN: Primary | ICD-10-CM

## 2024-10-16 DIAGNOSIS — I15.8 OTHER SECONDARY HYPERTENSION: Primary | ICD-10-CM

## 2024-10-16 DIAGNOSIS — E11.65 TYPE 2 DIABETES MELLITUS WITH HYPERGLYCEMIA, WITHOUT LONG-TERM CURRENT USE OF INSULIN: ICD-10-CM

## 2024-10-16 LAB
ANION GAP SERPL CALC-SCNC: 12 MEQ/L
BUN SERPL-MCNC: 14.9 MG/DL (ref 8.9–20.6)
CALCIUM SERPL-MCNC: 9 MG/DL (ref 8.4–10.2)
CHLORIDE SERPL-SCNC: 105 MMOL/L (ref 98–107)
CHOLEST SERPL-MCNC: 188 MG/DL
CHOLEST/HDLC SERPL: 5 {RATIO} (ref 0–5)
CO2 SERPL-SCNC: 21 MMOL/L (ref 22–29)
CREAT SERPL-MCNC: 1.09 MG/DL (ref 0.73–1.18)
CREAT UR-MCNC: 122.7 MG/DL (ref 63–166)
CREAT/UREA NIT SERPL: 14
GFR SERPLBLD CREATININE-BSD FMLA CKD-EPI: >60 ML/MIN/1.73/M2
GLUCOSE SERPL-MCNC: 165 MG/DL (ref 74–100)
HDLC SERPL-MCNC: 38 MG/DL (ref 35–60)
MICROALBUMIN UR-MCNC: 20.7 UG/ML
MICROALBUMIN/CREAT RATIO PNL UR: 16.9 MG/GM CR (ref 0–30)
POTASSIUM SERPL-SCNC: 4.1 MMOL/L (ref 3.5–5.1)
SODIUM SERPL-SCNC: 138 MMOL/L (ref 136–145)
TRIGL SERPL-MCNC: 420 MG/DL (ref 34–140)

## 2024-10-16 PROCEDURE — 4010F ACE/ARB THERAPY RXD/TAKEN: CPT | Mod: CPTII,,, | Performed by: NURSE PRACTITIONER

## 2024-10-16 PROCEDURE — 1159F MED LIST DOCD IN RCRD: CPT | Mod: CPTII,,, | Performed by: NURSE PRACTITIONER

## 2024-10-16 PROCEDURE — 36415 COLL VENOUS BLD VENIPUNCTURE: CPT | Performed by: NURSE PRACTITIONER

## 2024-10-16 PROCEDURE — 80061 LIPID PANEL: CPT | Performed by: NURSE PRACTITIONER

## 2024-10-16 PROCEDURE — 3078F DIAST BP <80 MM HG: CPT | Mod: CPTII,,, | Performed by: NURSE PRACTITIONER

## 2024-10-16 PROCEDURE — 99214 OFFICE O/P EST MOD 30 MIN: CPT | Mod: S$PBB,,, | Performed by: NURSE PRACTITIONER

## 2024-10-16 PROCEDURE — 82570 ASSAY OF URINE CREATININE: CPT | Performed by: NURSE PRACTITIONER

## 2024-10-16 PROCEDURE — 92228 IMG RTA DETC/MNTR DS PHY/QHP: CPT | Mod: TC,PBBFAC

## 2024-10-16 PROCEDURE — 99215 OFFICE O/P EST HI 40 MIN: CPT | Mod: PBBFAC,25 | Performed by: NURSE PRACTITIONER

## 2024-10-16 PROCEDURE — 80048 BASIC METABOLIC PNL TOTAL CA: CPT | Performed by: NURSE PRACTITIONER

## 2024-10-16 PROCEDURE — 3008F BODY MASS INDEX DOCD: CPT | Mod: CPTII,,, | Performed by: NURSE PRACTITIONER

## 2024-10-16 PROCEDURE — 3044F HG A1C LEVEL LT 7.0%: CPT | Mod: CPTII,,, | Performed by: NURSE PRACTITIONER

## 2024-10-16 PROCEDURE — 3075F SYST BP GE 130 - 139MM HG: CPT | Mod: CPTII,,, | Performed by: NURSE PRACTITIONER

## 2024-10-16 PROCEDURE — 1160F RVW MEDS BY RX/DR IN RCRD: CPT | Mod: CPTII,,, | Performed by: NURSE PRACTITIONER

## 2024-10-16 PROCEDURE — 92228 IMG RTA DETC/MNTR DS PHY/QHP: CPT | Mod: PBBFAC

## 2024-10-16 RX ORDER — ROSUVASTATIN CALCIUM 5 MG/1
5 TABLET, COATED ORAL NIGHTLY
Qty: 90 TABLET | Refills: 3 | Status: CANCELLED | OUTPATIENT
Start: 2024-10-16 | End: 2025-10-16

## 2024-10-16 RX ORDER — TIRZEPATIDE 7.5 MG/.5ML
7.5 INJECTION, SOLUTION SUBCUTANEOUS
Qty: 2 ML | Refills: 3 | Status: SHIPPED | OUTPATIENT
Start: 2024-10-16

## 2024-10-16 NOTE — PATIENT INSTRUCTIONS
Jaspreet Mclaughlin,     If you are due for any health screening(s) below please notify me so we can arrange them to be ordered and scheduled. Most healthy patients at your age complete them, but you are free to accept or refuse.     If you can't do it, I'll definitely understand. If you can, I'd certainly appreciate it!    Tests to Keep You Healthy    Eye Exam: SCHEDULED  Last Blood Pressure <= 139/89 (10/16/2024): Yes  Last HbA1c < 8 (07/15/2024): Yes      Your diabetic retinal eye exam is due     Diabetes is the #1 cause of blindness in the [place] - early detection before signs or symptoms develop can prevent debilitating blindness.     Our records indicate that you may be overdue for your annual diabetic eye exam. Eye screening can help identify patients at risk for developing vision loss which is common in diabetes. This simple screening is an important step to keeping you healthy and preventing complications from diabetes.     This recommended diabetic eye exam should take place once per year and can prevent and treat diabetes complications in the eye before developing symptoms. This can be done with a special camera is used to take photographs of the back of your eye without having to dilate them, or you can see an eye doctor for a full dilated exam.     If you recently had your yearly diabetic eye exam performed outside of [facility], please let your Health care team know so that they can update your health record.

## 2024-10-16 NOTE — ASSESSMENT & PLAN NOTE
Last hemoglobin 1 AC was not July 15, 2024 5.3 %.  Patient state he stopped taking Mounjaro 10 mg subQ every 7 days due to abdominal issue from nausea to diarrhea and heartburn.  Patient state he stopped Mounjaro about 3 weeks ago. Patient admit his dietary habits has not been up to par.  Discussed with patient is important to follow diabetic diet meal planning and continue to be physically active.  Patient agreed to decrease Mounjaro to 7.5 mg subQ every 7 days.  Patient is aware he needs to rotate injection site.  Stop smoking cigarettes.  Keep salt intake less than 2 g per day.  Patient to return to clinic in 3 months for diabetes follow-up.  Diabetic eye exam pending  Lipid profile pending  Foot exam completed  Microalbumin pending.  Questions solicited and answered, patient verbalized understanding.

## 2024-10-16 NOTE — ASSESSMENT & PLAN NOTE
Pressure at goal.  Current blood pressure 130/77.  Patient tolerating valsartan 40 mg p.o. once daily.  Continue follow low-salt diet less than 2 g per day.  Patient to read discharge education materials.  Questions solicited and answered, patient verbalized understanding.

## 2024-10-16 NOTE — PROGRESS NOTES
PLEASE CALL PATIENTS WITH RESULTS,  Right Eye   No Background Diabetic Retinopathy.     Left Eye   No Background Diabetic Retinopathy.     Keep appointment in December 07/20/2024 with Ophthalmology Clinic.

## 2024-10-16 NOTE — PROGRESS NOTES
Patient Name: Arnoldo Crane Jr.   : 1980  MRN: [id number]     SUBJECTIVE DATA:    CHIEF COMPLAINT:   Arnoldo Crane Jr. is a 44 y.o. male who presents to clinic today with Follow-up (Routine diabetes follow up, no complaints.)        HPI:  44-year-old male presents to the clinic to follow-up on diabetes.    07/15/2024    Diabetes:  Hemoglobin A1c on 2024 5.7%.  Patient state he is compliant with Mounjaro 7.5 mg subQ once weekly.  Patient state he stopped taking Jardiance and glipizide.  The reason he stopped taking because his blood sugar was dropping.  Advise patient glipizide can be the cause.  Will stop glipizide but we need to continue with Jardiance 25 mg p.o. once daily for kidney protection.  Patient's last visit weight on 2024 was 318 lb and currently he is 297 lb.  Patient state he was approved for lumbar spine fusion L4-L5 with Dr. Angelique rahman on at [facility] [place].  Patient state he needs to lose weight before surgery as well he needs to quit smoking.  Recommended smoke cessation, patient declined because he state he can do it on his own.  Regarding weight management, patient unable to exercise effectively due to lower back pain and leg tingling.  Discussed calorie restriction 1500 calories a day which should help him lose weight and also he has not been exercising so he does not need high calorie intake.  Patient to read discharge education materials.  Discussed with patient will increase Mounjaro to 10 mg subQ every 7 days.  Restart Jardiance 25 mg p.o. once daily.  Hemoglobin A1c pending.  Will notify patient of test results when it becomes available.  Return to clinic in 3 months for diabetes follow-up.  Return to clinic sooner if needed.    10/16/2024    Diabetes:  Last hemoglobin 1 AC was not July 15, 2024 5.3 %.  Patient state he stopped taking Mounjaro 10 mg subQ every 7 days due to abdominal issue from nausea to diarrhea and heartburn.  Patient  state he stopped Mounjaro about 3 weeks ago. Patient admit his dietary habits has not been up to par.  Discussed with patient is important to follow diabetic diet meal planning and continue to be physically active.  Patient agreed to decrease Mounjaro to 7.5 mg subQ every 7 days.  Patient is aware he needs to rotate injection site.  Stop smoking cigarettes.  Keep salt intake less than 2 g per day.  Patient to return to clinic in 3 months for diabetes follow-up.  Diabetic eye exam pending  Lipid profile pending  Foot exam completed  Microalbumin pending.  Questions solicited and answered, patient verbalized understanding.    The 10-year ASCVD risk score (Augusto DIALLO, et al., 2019) is: 4.3%    Values used to calculate the score:      Age: 44 years      Sex: Male      Is Non- : No      Diabetic: Yes      Tobacco smoker: No      Systolic Blood Pressure: 130 mmHg      Is BP treated: Yes      HDL Cholesterol: 38 mg/dL      Total Cholesterol: 188 mg/dL        Patient denies chest pain, shortness of breath, dyspnea on exertion, palpitations, peripheral edema, abdominal pain, nausea, vomiting, diarrhea, constipation, fatigue, fever, chills, dysuria,  hematuria, melena, or hematochezia.        ALLERGIES:   Review of patient's allergies indicates:   Allergen Reactions    Metformin Rash         ROS:  Review of Systems   All other systems reviewed and are negative.        OBJECTIVE DATA:  Vital signs  Vitals:    10/16/24 0744   BP: 130/77   Patient Position: Sitting   Pulse: 82   Resp: 18   Temp: 98.1 °F (36.7 °C)   TempSrc: Oral   SpO2: 100%   Weight: (!) 146.1 kg (322 lb 3.2 oz)   Height: 6' (1.829 m)      Body mass index is 43.7 kg/m².    PHYSICAL EXAM:   Physical Exam  Vitals and nursing note reviewed.   Constitutional:       General: He is awake. He is not in acute distress.     Appearance: Normal appearance. He is well-developed and well-groomed. He is morbidly obese. He is not ill-appearing,  toxic-appearing or diaphoretic.   HENT:      Head: Normocephalic and atraumatic.      Right Ear: External ear normal.      Left Ear: External ear normal.      Nose: Nose normal.      Mouth/Throat:      Lips: Pink.      Mouth: Mucous membranes are moist.      Tongue: Tongue does not deviate from midline.      Palate: No lesions.      Pharynx: Oropharynx is clear.   Eyes:      General: Lids are normal.      Extraocular Movements: Extraocular movements intact.      Pupils: Pupils are equal, round, and reactive to light.   Neck:      Trachea: Trachea and phonation normal.   Cardiovascular:      Rate and Rhythm: Normal rate and regular rhythm.      Pulses: Normal pulses.           Radial pulses are 2+ on the right side and 2+ on the left side.        Dorsalis pedis pulses are 2+ on the right side and 2+ on the left side.        Posterior tibial pulses are 2+ on the right side and 2+ on the left side.      Heart sounds: Normal heart sounds. No murmur heard.  Pulmonary:      Effort: Pulmonary effort is normal.      Breath sounds: Normal breath sounds and air entry. No wheezing or rhonchi.   Abdominal:      General: Abdomen is flat.      Palpations: Abdomen is soft.      Tenderness: There is no abdominal tenderness.   Genitourinary:     Comments: Denies any urinary symptoms or bowel habit changes  Musculoskeletal:         General: Normal range of motion.      Cervical back: Normal range of motion and neck supple.      Right lower leg: No edema.      Left lower leg: No edema.      Right foot: Normal range of motion. No deformity, bunion, Charcot foot, foot drop or prominent metatarsal heads.      Left foot: Normal range of motion. No deformity, bunion, Charcot foot, foot drop or prominent metatarsal heads.   Feet:      Right foot:      Protective Sensation: 10 sites tested.  10 sites sensed.      Skin integrity: Dry skin present.      Toenail Condition: Right toenails are normal.      Left foot:      Protective Sensation: 10  sites tested.  10 sites sensed.      Skin integrity: Dry skin present.      Toenail Condition: Left toenails are normal.      Comments: Continue to wear appropriate footwear.  Keep feet moisturized, do not apply moisturizers between toe webs.  Inspect feet daily.  Lymphadenopathy:      Cervical: No cervical adenopathy.   Skin:     General: Skin is warm.      Capillary Refill: Capillary refill takes less than 2 seconds.   Neurological:      General: No focal deficit present.      Mental Status: He is alert and oriented to person, place, and time. Mental status is at baseline.      GCS: GCS eye subscore is 4. GCS verbal subscore is 5. GCS motor subscore is 6.      Cranial Nerves: Cranial nerves 2-12 are intact. No cranial nerve deficit.      Sensory: Sensation is intact. No sensory deficit.      Motor: Motor function is intact. No weakness.      Coordination: Coordination is intact. Coordination normal.      Gait: Gait is intact. Gait normal.   Psychiatric:         Attention and Perception: Attention and perception normal.         Mood and Affect: Mood and affect normal.         Speech: Speech normal.         Behavior: Behavior normal. Behavior is cooperative.         Thought Content: Thought content normal.         Cognition and Memory: Cognition and memory normal.         Judgment: Judgment normal.          ASSESSMENT/PLAN:  1. Other secondary hypertension  Assessment & Plan:  Pressure at goal.  Current blood pressure 130/77.  Patient tolerating valsartan 40 mg p.o. once daily.  Continue follow low-salt diet less than 2 g per day.  Patient to read discharge education materials.  Questions solicited and answered, patient verbalized understanding.    Orders:  -     Basic Metabolic Panel    2. Type 2 diabetes mellitus with hyperglycemia, without long-term current use of insulin  Assessment & Plan:  Last hemoglobin 1 AC was not July 15, 2024 5.3 %.  Patient state he stopped taking Mounjaro 10 mg subQ every 7 days due to  abdominal issue from nausea to diarrhea and heartburn.  Patient state he stopped Mounjaro about 3 weeks ago. Patient admit his dietary habits has not been up to par.  Discussed with patient is important to follow diabetic diet meal planning and continue to be physically active.  Patient agreed to decrease Mounjaro to 7.5 mg subQ every 7 days.  Patient is aware he needs to rotate injection site.  Stop smoking cigarettes.  Keep salt intake less than 2 g per day.  Patient to return to clinic in 3 months for diabetes follow-up.  Diabetic eye exam pending  Lipid profile pending  Foot exam completed  Microalbumin pending.  Questions solicited and answered, patient verbalized understanding.    Orders:  -     Cancel: Hemoglobin A1C  -     Microalbumin/Creatinine Ratio, Urine  -     Lipid Panel  -     Foot Exam Performed  -     Diabetic Eye Screening Photo  -     tirzepatide (MOUNJARO) 7.5 mg/0.5 mL PnIj; Inject 7.5 mg into the skin every 7 days.  Dispense: 2 mL; Refill: 3  -     Basic Metabolic Panel           RESULTS:  No results found for this or any previous visit (from the past 6 weeks).      Follow Up:  Follow up in about 3 months (around 1/16/2025).     35 minutes of total time spent on the encounter, which includes face to face time and non-face to face time preparing to see the patient (eg, review of tests), Obtaining and/or reviewing separately obtained history, Documenting clinical information in the electronic or other health record, Independently interpreting results (not separately reported) and communicating results to the patient/family/caregiver, or Care coordination (not separately reported).        Previous medical history/lab work/radiology reviewed and considered during medical management decisions.   Medication list reviewed and medication reconciliation performed.  Patient was provided  and care about his/her current diagnosis (es) and medications including risk/benefit and side effects/adverse  events, over the counter medication uses/doses, home self-care and contact precautions,  and red flags and indications for when to seek immediate medical attention.   Patient was advised to continue compliance with current medication list and medical recommendations.  Patient dvised continued compliance with recommended eating habits/ diets for medical conditions and exercise 150 minutes/ week (if possible) for medical condition (s).  Educational handouts and instructions on selected disease management in AVS (After Visit Summary).    All of the patient's questions were answered to patient's satisfaction.   The patient was receptive, expressed verbal understanding and agreement the above plan.      This note was created with the assistance of a voice recognition software or phone dictation. There may be transcription errors as a result of using this technology however minimal. Effort has been made to assure accuracy of transcription but any obvious errors or omissions should be clarified with the author of the document

## 2024-10-16 NOTE — PROGRESS NOTES
Arnoldo Gonzalez Royce Henderson is a 44 y.o. male here for a diabetic eye screening with non-dilated fundus photos per KASANDRA Schultz.    Patient cooperative?: Yes  Small pupils?: No  Last eye exam: Unknown    For exam results, see Encounter Report.

## 2024-10-17 RX ORDER — VALSARTAN 40 MG/1
40 TABLET ORAL DAILY
Qty: 90 TABLET | Refills: 3 | Status: SHIPPED | OUTPATIENT
Start: 2024-10-17 | End: 2025-10-17

## 2024-10-17 RX ORDER — ROSUVASTATIN CALCIUM 5 MG/1
5 TABLET, COATED ORAL NIGHTLY
Qty: 90 TABLET | Refills: 3 | Status: SHIPPED | OUTPATIENT
Start: 2024-10-17 | End: 2025-10-17

## 2024-10-17 NOTE — PROGRESS NOTES
PLEASE CALL PATIENTS WITH RESULTS  Diovan 40 mg p.o. once daily for blood pressure.  Refill sent to preferred pharmacy  Crestor 5 mg p.o. nightly sent to preferred pharmacy for cholesterol management.  Kidney liver functions within normal range.    Microalbumin to creatinine ratio much improvement at 16.9.  Continue medications as prescribed.  Happy holidays.

## 2024-10-18 ENCOUNTER — TELEPHONE (OUTPATIENT)
Dept: FAMILY MEDICINE | Facility: CLINIC | Age: 44
End: 2024-10-18
Payer: MEDICAID

## 2024-10-18 NOTE — TELEPHONE ENCOUNTER
----- Message from KASANDRA Schultz sent at 10/17/2024  8:01 AM CDT -----  PLEASE CALL PATIENTS WITH RESULTS  Diovan 40 mg p.o. once daily for blood pressure.  Refill sent to preferred pharmacy  Crestor 5 mg p.o. nightly sent to preferred pharmacy for cholesterol management.  Kidney liver functions within normal range.    Microalbumin to creatinine ratio much improvement at 16.9.  Continue medications as prescribed.  Happy holidays.

## 2024-10-18 NOTE — TELEPHONE ENCOUNTER
----- Message from KASANDRA Schultz sent at 10/16/2024 10:40 AM CDT -----  PLEASE CALL PATIENTS WITH RESULTS,  Right Eye   No Background Diabetic Retinopathy.     Left Eye   No Background Diabetic Retinopathy.     Keep appointment in December 07/20/2024 with [facility].

## 2024-10-18 NOTE — TELEPHONE ENCOUNTER
Attempted to call patient no answer left voicemail.       ----- Message from KASANDRA Schultz sent at 10/17/2024  8:01 AM CDT -----  PLEASE CALL PATIENTS WITH RESULTS  Diovan 40 mg p.o. once daily for blood pressure.  Refill sent to preferred pharmacy  Crestor 5 mg p.o. nightly sent to preferred pharmacy for cholesterol management.  Kidney liver functions within normal range.    Microalbumin to creatinine ratio much improvement at 16.9.  Continue medications as prescribed.  Happy holidays.

## 2024-10-21 ENCOUNTER — TELEPHONE (OUTPATIENT)
Dept: FAMILY MEDICINE | Facility: CLINIC | Age: 44
End: 2024-10-21
Payer: MEDICAID

## 2024-10-22 ENCOUNTER — TELEPHONE (OUTPATIENT)
Dept: FAMILY MEDICINE | Facility: CLINIC | Age: 44
End: 2024-10-22
Payer: MEDICAID

## 2024-11-12 DIAGNOSIS — N52.2 DRUG-INDUCED ERECTILE DYSFUNCTION: ICD-10-CM

## 2024-11-12 RX ORDER — TADALAFIL 20 MG/1
TABLET ORAL
Qty: 30 TABLET | Refills: 2 | Status: SHIPPED | OUTPATIENT
Start: 2024-11-12

## 2025-01-09 ENCOUNTER — PATIENT MESSAGE (OUTPATIENT)
Dept: BEHAVIORAL HEALTH | Facility: CLINIC | Age: 45
End: 2025-01-09
Payer: MEDICAID

## 2025-01-09 ENCOUNTER — PATIENT MESSAGE (OUTPATIENT)
Dept: FAMILY MEDICINE | Facility: CLINIC | Age: 45
End: 2025-01-09
Payer: MEDICAID

## 2025-01-09 DIAGNOSIS — F33.1 MODERATE EPISODE OF RECURRENT MAJOR DEPRESSIVE DISORDER: Chronic | ICD-10-CM

## 2025-01-09 DIAGNOSIS — F51.04 PSYCHOPHYSIOLOGICAL INSOMNIA: ICD-10-CM

## 2025-01-09 DIAGNOSIS — F41.1 GAD (GENERALIZED ANXIETY DISORDER): Chronic | ICD-10-CM

## 2025-01-13 RX ORDER — ZOLPIDEM TARTRATE 12.5 MG/1
12.5 TABLET, FILM COATED, EXTENDED RELEASE ORAL NIGHTLY PRN
Qty: 30 TABLET | Refills: 5 | Status: SHIPPED | OUTPATIENT
Start: 2025-01-13

## 2025-01-13 RX ORDER — DULOXETIN HYDROCHLORIDE 60 MG/1
60 CAPSULE, DELAYED RELEASE ORAL DAILY
Qty: 90 CAPSULE | Refills: 1 | Status: SHIPPED | OUTPATIENT
Start: 2025-01-13

## 2025-01-13 RX ORDER — BUSPIRONE HYDROCHLORIDE 10 MG/1
10 TABLET ORAL 2 TIMES DAILY
Qty: 180 TABLET | Refills: 1 | Status: SHIPPED | OUTPATIENT
Start: 2025-01-13

## 2025-01-17 ENCOUNTER — PATIENT MESSAGE (OUTPATIENT)
Dept: FAMILY MEDICINE | Facility: CLINIC | Age: 45
End: 2025-01-17
Payer: MEDICAID

## 2025-01-17 ENCOUNTER — E-VISIT (OUTPATIENT)
Dept: URGENT CARE | Facility: CLINIC | Age: 45
End: 2025-01-17
Payer: MEDICAID

## 2025-01-17 DIAGNOSIS — M25.531 PAIN IN BOTH WRISTS: Primary | ICD-10-CM

## 2025-01-17 DIAGNOSIS — R20.2 PARESTHESIA AND PAIN OF BOTH UPPER EXTREMITIES: ICD-10-CM

## 2025-01-17 DIAGNOSIS — M79.601 PARESTHESIA AND PAIN OF BOTH UPPER EXTREMITIES: ICD-10-CM

## 2025-01-17 DIAGNOSIS — M25.532 PAIN IN BOTH WRISTS: Primary | ICD-10-CM

## 2025-01-17 DIAGNOSIS — M79.602 PARESTHESIA AND PAIN OF BOTH UPPER EXTREMITIES: ICD-10-CM

## 2025-01-17 PROCEDURE — 99421 OL DIG E/M SVC 5-10 MIN: CPT | Mod: ,,, | Performed by: NURSE PRACTITIONER

## 2025-01-17 RX ORDER — MELOXICAM 15 MG/1
15 TABLET ORAL DAILY
Qty: 30 TABLET | Refills: 0 | Status: SHIPPED | OUTPATIENT
Start: 2025-01-17 | End: 2025-02-16

## 2025-01-17 NOTE — PROGRESS NOTES
Dear Arnoldo,     Thank you for reaching out to me for an E-Visit so we can address your concern regarding: General Illness (Entered automatically based on patient selection in [facility].)     I have reviewed your chart and read the answers to the questionnaire that you completed.  Based on the information provided, my diagnosis and recommendations are as follows:    Visit Diagnosis    1. Pain in both wrists    2. Paresthesia and pain of both upper extremities           Visit Orders  No orders of the defined types were placed in this encounter.       Please let me know if there is something else that you need.  If you send additional messages concerning this illness, please use this message thread to communicate.      KASANDRA Tran

## 2025-01-24 ENCOUNTER — PATIENT MESSAGE (OUTPATIENT)
Dept: FAMILY MEDICINE | Facility: CLINIC | Age: 45
End: 2025-01-24
Payer: MEDICAID

## 2025-01-30 ENCOUNTER — E-VISIT (OUTPATIENT)
Dept: FAMILY MEDICINE | Facility: CLINIC | Age: 45
End: 2025-01-30
Payer: MEDICAID

## 2025-01-30 DIAGNOSIS — M54.2 CERVICALGIA: ICD-10-CM

## 2025-01-30 DIAGNOSIS — M54.2 NECK PAIN: ICD-10-CM

## 2025-01-30 DIAGNOSIS — G62.9 NEUROPATHY: Primary | ICD-10-CM

## 2025-01-30 RX ORDER — PERPHENAZINE 16 MG
600 TABLET ORAL DAILY
Qty: 30 EACH | Refills: 1 | Status: SHIPPED | OUTPATIENT
Start: 2025-01-30

## 2025-01-30 NOTE — PROGRESS NOTES
Patient ID: Arnoldo Crane Jr. is a 44 y.o. male.    Chief Complaint: General Illness (Entered automatically based on patient selection in [facility].)          274}  The patient initiated a request through [facility] [facility] on 1/30/2025 for evaluation and management with a chief complaint of General Illness (Entered automatically based on patient selection in [facility] [facility].)     I evaluated the questionnaire submission on 01/30/2025 .    Total Time (in minutes): 12     [facility] Peq Evisit [facility]group-Muscle,Back,Joint    1/30/2025  9:04 AM CST - Filed by Patient   What do you need help with? Wrist Problem   Do you agree to participate in an E-Visit? Yes   If you have any of the following symptoms, please present to your local emergency room or call 911:  I acknowledge   What is the main issue you would like addressed today? My pain and numbness in fingers to wrist and up to kneck   Do you have any of the following: New or worsening joint pain   Provide any additional information you feel is important. I cant sleep anymore with the pain   Please attach any relevant images or files    Are you able to take your vital signs? No   Do you have a history of any of the following: None   What joint(s) are you having a problem with? Wrist;  Hand   Describe the exact location of the pain. Finger tips and wrist and kneck   What activites make your joint pain worse? Bending the joint;  Getting up from sitting or lying down;  Lying down;  Overhead activities;  Sitting down   What have you used to help with your joint pain? Anti-inflammatory (ibuprofen, Aleve, Advil)   Has there been any change in your joint pain based on the treatment you have tried? Unchanged   Do you have any history of joint injuries, surgeries, or other medical conditions that may be related to your joint pain? No          Active Problem List with Overview Notes    Diagnosis Date Noted    Drug-induced erectile dysfunction 08/14/2024    Gastroesophageal reflux disease 07/15/2024     Class 3 severe obesity due to excess calories with serious comorbidity and body mass index (BMI) of 40.0 to 44.9 in adult 11/06/2023    Encounter for counseling for sleep apnea 11/06/2023    Other secondary hypertension 11/06/2023    Moderate episode of recurrent major depressive disorder 10/18/2023    DOUG (generalized anxiety disorder) 10/18/2023    Type 2 diabetes mellitus with hyperglycemia, without long-term current use of insulin 10/11/2023    Psychophysiological insomnia 10/11/2023    Positive depression screening 10/11/2023     I have reviewed the positive depression score which warrants active treatment with psychotherapy and/or medications.      Anxiety and depression 10/11/2023    Tinea pedis of both feet 10/11/2023      Recent Labs Obtained:  Lab Results   Component Value Date    WBC 6.05 09/29/2023    HGB 14.9 09/29/2023    HCT 42.8 09/29/2023    MCV 82.9 09/29/2023     09/29/2023     10/16/2024    K 4.1 10/16/2024    CREATININE 1.09 10/16/2024    EGFRNORACEVR >60 10/16/2024    HGBA1C 5.3 07/15/2024    TSH 2.418 11/06/2023      Review of patient's allergies indicates:   Allergen Reactions    Metformin Rash       Encounter Diagnoses   Name Primary?    Neuropathy Yes    Neck pain     Cervicalgia         Orders Placed This Encounter   Procedures    MRI Cervical Spine Without Contrast     Standing Status:   Future     Standing Expiration Date:   1/30/2026     Order Specific Question:   Does the patient have or ever had a pacemaker or a defibrillator (Note: Some facilities may not be able to schedule an MRI for patients with pacemakers and defibrillators. You should contact your local radiology dept to determine if this is the case.)?     Answer:   No     Order Specific Question:   Does the patient have an aneurysm or surgical clip, pump, nerve/brain stimulator, middle/inner ear prosthesis, or other metal implant or foreign object (bullet, shrapnel)? If they have a card related to their  implant, ask them to bring it. Issues related to the implant may cause the MRI to be delayed.     Answer:   No     Order Specific Question:   Is the patient claustrophobic?     Answer:   No     Order Specific Question:   Will the patient require po anxiolysis or sedation?     Answer:   No     Order Specific Question:   May the Radiologist modify the order per protocol to meet the clinical needs of the patient?     Answer:   Yes     Order Specific Question:   Is this part of a Research Study?     Answer:   No     Order Specific Question:   Recist criteria?     Answer:   No     Order Specific Question:   Does the patient have on a skin patch for medication with aluminized backing?     Answer:   No    Ambulatory referral/consult to Orthopedics     Standing Status:   Future     Standing Expiration Date:   2/28/2026     Referral Priority:   Routine     Referral Type:   Consultation     Requested Specialty:   Orthopedic Surgery     Number of Visits Requested:   1      Medications Ordered This Encounter   Medications    alpha lipoic acid 600 mg Cap     Sig: Take 600 mg by mouth once daily.     Dispense:  30 each     Refill:  1        E-Visit Time Tracking:    Day 1 Time (in minutes): 12    Total Time (in minutes): 12      274}

## 2025-02-21 ENCOUNTER — OFFICE VISIT (OUTPATIENT)
Dept: BEHAVIORAL HEALTH | Facility: CLINIC | Age: 45
End: 2025-02-21
Payer: MEDICAID

## 2025-02-21 VITALS
HEIGHT: 72 IN | SYSTOLIC BLOOD PRESSURE: 126 MMHG | DIASTOLIC BLOOD PRESSURE: 76 MMHG | WEIGHT: 315 LBS | BODY MASS INDEX: 42.66 KG/M2

## 2025-02-21 DIAGNOSIS — F33.1 MODERATE EPISODE OF RECURRENT MAJOR DEPRESSIVE DISORDER: Primary | Chronic | ICD-10-CM

## 2025-02-21 DIAGNOSIS — F41.1 GAD (GENERALIZED ANXIETY DISORDER): Chronic | ICD-10-CM

## 2025-02-21 DIAGNOSIS — F51.04 PSYCHOPHYSIOLOGICAL INSOMNIA: ICD-10-CM

## 2025-02-21 PROCEDURE — 99212 OFFICE O/P EST SF 10 MIN: CPT | Mod: PBBFAC,PN | Performed by: NURSE PRACTITIONER

## 2025-02-21 NOTE — PROGRESS NOTES
Follow up #4  02/21/2025  HPI: Arnoldo Crane Jr. is a 44 y.o. male here today for a psychiatric evaluation referred by PCP to the [facility] for anxiety and depression  Past Medical History: Anxiety disorder, unspecified, Depression, Insomnia, Morbidly obese, Type 2 diabetes mellitus without complications     Last visit: Patient states that he is doing well. He is still taking the Cymbalta 60mg a day, Buspar 20mg at HS, and Ambien CR 12.5mg at HS. He denies the need for any med changes.  He will have back surgery in October.  He still has not had a sleep study. Is sleeping well on Ambien CR 12.5mg and denies any SE/sleep walking.     This visit: Patient states that he is doing well. He is still taking the Cymbalta 60mg a day, Buspar 20mg at HS, and Ambien CR 12.5mg at HS. He denies the need for any med changes.  He did not have his back surgery in October. He was advised by several physicians not to have the surgery. He took a settlement. He is back to work. 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     FU in 6 months     PHQ Score:   02/21/2025: 2  08/21/2024: 2 minimal  02/21/2024: 1  12/20/2023: 2   10/18/2023: 17 moderate    DOUG-7 Score:   02/21/2025: 0  08/21/2024: 2 normal  02/21/2024: 0  12/20/2023: 0  10/18/2023: 17 severe    Mental Status Evaluation:  Appearance:  unremarkable, age appropriate   Behavior:  normal, cooperative   Speech:  no latency; no press   Mood:  euthymic   Affect:  congruent and appropriate   Thought Process:  normal and logical   Thought Content:  normal, no suicidality, no homicidality, delusions, or paranoia   Sensorium:  grossly intact   Cognition:  grossly intact   Insight:  intact   Judgment:  behavior is adequate to circumstances     Impression:  Generalized anxiety disorder  2. Major Depression Disorder recurrent moderate  3. Insomnia     Plan:  Continue  Cymbalta  60mg a day  2.  Continue Buspar 20mg at HS  3. Continue Ambien CR 12.5mg at HS  4. Follow-up in 6 months        Follow up #3  08/21/2024  HPI: Arnoldo Crane Jr. is a 44 y.o. male here today for a psychiatric evaluation referred by PCP to the [facility] for anxiety and depression  Past Medical History: Anxiety disorder, unspecified, Depression, Insomnia, Morbidly obese, Type 2 diabetes mellitus without complications     On his last visit, patient stated that overall, he was still doing better on the Cymbalta than the Zoloft but could not tell any difference in his mood on the higher dose of Cymbalta. But, he did not feel the need for another increase in the dose. He was sleeping well with Ambien and denied sleep walking. Had not yet had a sleep study.    Today he states that he is doing well. He is still taking the Cymbalta 60mg a day, Buspar 20mg at HS, and Ambien CR 12.5mg at HS. He denies the need for any med changes.  He will have back surgery in October.  He still has not had a sleep study. Is sleeping well on Ambien CR 12.5mg and denies any SE/sleep walking.     FU in 6 months virtual    PHQ Score:   08/21/2024: 2 minimal  02/21/2024: 1  12/20/2023: 2   10/18/2023: 17 moderate    DOUG-7 Score:   08/21/2024: 2 normal  02/21/2024: 0  12/20/2023: 0  10/18/2023: 17 severe    Mental Status Evaluation:  Appearance:  unremarkable, age appropriate   Behavior:  normal, cooperative   Speech:  no latency; no press   Mood:  euthymic   Affect:  congruent and appropriate   Thought Process:  normal and logical   Thought Content:  normal, no suicidality, no homicidality, delusions, or paranoia   Sensorium:  grossly intact   Cognition:  grossly intact   Insight:  intact   Judgment:  behavior is adequate to circumstances     Impression:  Generalized anxiety disorder  2. Major Depression Disorder recurrent moderate  3. Insomnia     Plan:  Continue  Cymbalta  60mg a day  2.  Continue Buspar 20mg at HS  3. Continue Ambien CR 12.5mg at HS  4. Follow-up in 6 months virtual    Follow up #2 02/21/2024  HPI: Arnoldo Crane  . is a 43 y.o. male here today for a psychiatric evaluation referred by PCP to the [facility] for anxiety and depression  Past Medical History: Anxiety disorder, unspecified, Depression, Insomnia, Morbidly obese, Type 2 diabetes mellitus without complications     On his last visit, patient stated that he was doing much better on Cymbalta than he was on Zoloft. The Cymbalta was increased to 60mg a day.     Today, patient states that overall, he is still doing better on the Cymbalta than the Zoloft but he cannot tell any difference on a higher dose. He does not feel the need for an increase in the dose.     He is still sleeping well with Ambien. He denies sleep walking.   Has not yet had a sleep study.    FU in 6 months    PHQ Score:   02/21/2024: 1  12/20/2023: 2   10/18/2023: 17 moderate    DOUG-7 Score:   02/21/2024: 0  12/20/2023: 0  10/18/2023: 17 severe    Mental Status Evaluation:  Appearance:  unremarkable, age appropriate   Behavior:  normal, cooperative   Speech:  no latency; no press   Mood:  euthymic   Affect:  congruent and appropriate   Thought Process:  normal and logical   Thought Content:  normal, no suicidality, no homicidality, delusions, or paranoia   Sensorium:  grossly intact   Cognition:  grossly intact   Insight:  intact   Judgment:  behavior is adequate to circumstances     Impression:  Generalized anxiety disorder  2. Major Depression Disorder recurrent moderate  3. Insomnia     Plan:  Continue  Cymbalta  60 mg   2. Increase Buspar to 10mg BID or TID  3. Continue Ambien CR 12.5mg at HS  3. Follow-up in 6 months    Follow up #1  12/20/2023  HPI: Arnoldo Crane . is a 43 y.o. male here today for a psychiatric evaluation referred by PCP to the [facility] for anxiety and depression  Past Medical History: Anxiety disorder, unspecified, Depression, Insomnia, Morbidly obese, Type 2 diabetes mellitus without complications     On his last visit, he was to taper off of Zoloft and  "start Cymbalta 30mg a day and Buspar BID to TID.     Today, patient states that he is doing much better on Cymbalta than he was on Zoloft. States that even he notices a difference.   He takes the Cymbalta daily and the Buspar twice a day.  Will increase Cymbalta to 60mg a day.   Sleeping well with Ambien. He denies sleep walking.   Was supposed to have a sleep study done 5 weeks ago but he was in too much pain and it has not yet been rescheduled.     PHQ Score:   12/20/2023: 2   10/18/2023: 17 moderate    DOUG-7 Score:   12/20/2023: 0  10/18/2023: 17 severe    Mental Status Evaluation:  Appearance:  unremarkable, age appropriate   Behavior:  normal, cooperative   Speech:  no latency; no press   Mood:  euthymic   Affect:  congruent and appropriate   Thought Process:  normal and logical   Thought Content:  normal, no suicidality, no homicidality, delusions, or paranoia   Sensorium:  grossly intact   Cognition:  grossly intact   Insight:  intact   Judgment:  behavior is adequate to circumstances     Impression:  Generalized anxiety disorder  2. Major Depression Disorder recurrent moderate  3. Insomnia     Plan:  Increase Cymbalta to 60 mg   2. Continue Buspar 5mg BID or TID  3. Continue Ambien CR 12.5mg at HS  3. Follow-up in 8 weeks      Initial Interview  10/18/2023  HPI: Arnoldo Guthrieelizabeth Henderson is a 43 y.o. male here today for a psychiatric evaluation referred by PCP to the [facility] for anxiety and depression  Past Medical History: Anxiety disorder, unspecified, Depression, Insomnia, Morbidly obese, Type 2 diabetes mellitus without complications       Patient states, " I have a lot of social anxiety, for the past couple of years. Hard to go, for instance, to the grocery store, I will sit in the parking lot for 10-15 minutes before going in. The same thing happens going to friends for cookouts, will go, and then my mood changes and then I am ready to go."    Unsure of any triggers for social anxiety. " "    Recently, 6 weeks ago, got hurt at work, went to urgent care for treatment, gave shot and back brace (Monday), then 4 days later, went back, received 2 shots and xray, sent home. The next Monday, went to hospital due to increase in pain and weakness, confusion. Found to be in diabetic ketoacidosis.  was hospitalized and placed on insulin drip, but signed himself out the next day AMA.     Works as a , went to  something and felt rip across  back, couldn't hardly move. That day went to the urgent care.  trying to get an MRI pushed through. Unsure if salary will be paid. Knows it will be a long process. Using ice therapy for pain,  does not like pain medication.     Currently taking Zoloft 100 mg daily, has been taking for 6-7 years,  has never been helpful.  placed on it during separation from ex-wife, for depression and anxiety.    When depressed, start thinking about not having income, don't know going to do, and have no answers. Can be at friend's house cooking or having a few drinks and get quiet, want to be alone. "Ghost mode."     Sleep is bad. Takes z quill to go to sleep, may sleep a few hours, wake up, take a dip, play on phone, may be several hours before can fall back asleep.   Interest: Denies   Guilt: Denies   Energy: Poor: Way down right now  Concentration: Not like it used to be, unable to focus  Appetite: Good, normal  Psychomotor: Normal  Suicide: Denies     He has a Rx for Ambien CR 12.5mg at HS that he has not been able to  from the pharmacy. He was told that the medication needed to be re-coded and then sent back to his insurance company.  Patient reports that he has been on Ambien CR 12.5mg in the past. He states that he does not sleep walk or eat while on Ambien.   He reports that he was required by his employer to undergo a sleep study while on Ambien to determine that he would tolerate the medication and he did so without incidence. "     Will also add Buspar 5mg two to three times a day as needed for anxiety.       Medications:   Current Outpatient Medications   Medication Instructions    blood sugar diagnostic Strp To check BG 2 times daily, to use with insurance preferred meter    blood-glucose meter kit To check BG 2 times daily, to use with insurance preferred meter    busPIRone (BUSPAR) 5 mg, Oral, 3 times daily    DULoxetine (CYMBALTA) 30 mg, Oral, Daily    glipiZIDE 5 mg, Oral, With breakfast    lancets Misc To check BG 2 times daily, to use with insurance preferred meter    metFORMIN (GLUCOPHAGE) 500 mg, Oral, 2 times daily with meals    miconazole NITRATE 2 % (ZEASORB AF) 2 % top powder Topical (Top), 2 times daily, And then as needed.  Make sure to apply between toe webs.    rosuvastatin (CRESTOR) 5 mg, Oral, Nightly    zolpidem (AMBIEN CR) 12.5 mg, Oral, Nightly PRN        Psychiatric History:   Reports a prior psychiatric history: Depression Anxiety  History of mental health out-patient treatment: Denies  History of in-patient psychiatric hospitalization: Denies   History of suicidal ideations: denies   History of suicidal threats: Denies   History of suicide attempts: Denies   History of self mutilation: Denies     History of psychotropic medications: Zoloft    Family Psychiatric History:  Mental Illness: Son - mental health diagnosis   Alcohol abuse/addiction: Denies   Drug addiction: Denies     Substance Use History:  Alcohol: drinks 1 time every 3 months, usually 1 glass of whiskey and coke  Marijuana: Denies   Benzodiazepines: Denies   Opiates: Denies   Stimulants:  Cocaine: Denies   Methamphetamine: Denies  Nicotine: Chew, dip, 2 cans a week  Caffeine: Cut out caffeine 4 months ago    Social History:   Grew up in: [place]   Raised by: Mother and Father   Number of siblings: 1 sister   Education: 6th grade, mother held back 2 times in  and 1st grade, then in 6th grade had appendicitis and was out for an extended  period of time for healing  Employment: , out on workman's comp for 6 weeks  Marital Status: Single  Children: 2 boys   Living situation: House  Rastafarian affiliation: Denies     Trauma History:  History of Emotional/Mental abuse: Denies   History of Physical abuse: Denies   History of Sexual abuse: Denies   History of other trauma: Denies     Legal History:  Legal history: Denies   Denies being on probation or parole Denies   Denies any upcoming court dates Denies   Denies any pending charges. Denies     PHQ Score:   10/18/2023: 17 moderate    DOUG-7 Score:   10/18/2023: 17 severe    Mental Status Evaluation:  Appearance:  unremarkable, age appropriate   Behavior:  normal, cooperative   Speech:  no latency; no press   Mood:  euthymic   Affect:  congruent and appropriate   Thought Process:  normal and logical   Thought Content:  normal, no suicidality, no homicidality, delusions, or paranoia   Sensorium:  grossly intact   Cognition:  grossly intact   Insight:  intact   Judgment:  behavior is adequate to circumstances     Impression:  Generalized anxiety disorder  2. Major Depression Disorder recurrent moderate  3. Insomnia     Plan:  Discontinue Zoloft, taper down to 50 mg x 2 weeks, then discontinue  2. Start Cymbalta 30 mg daily while tapering Zoloft   3. Start Buspar 5mg BID or TID  4. Follow-up in 8 weeks     Follow up in about 8 weeks (around 12/13/2023) for medication management, face to face.

## 2025-04-03 DIAGNOSIS — F51.04 PSYCHOPHYSIOLOGICAL INSOMNIA: ICD-10-CM

## 2025-04-03 RX ORDER — ZOLPIDEM TARTRATE 12.5 MG/1
12.5 TABLET, FILM COATED, EXTENDED RELEASE ORAL NIGHTLY PRN
Qty: 30 TABLET | Refills: 5 | Status: SHIPPED | OUTPATIENT
Start: 2025-04-03

## 2025-04-03 NOTE — TELEPHONE ENCOUNTER
Please see attached refill request.    Next scheduled office visit: 8/21/2025.    Last office visit: 2/21/2025.     Thank you!

## 2025-06-03 ENCOUNTER — PATIENT MESSAGE (OUTPATIENT)
Dept: ADMINISTRATIVE | Facility: OTHER | Age: 45
End: 2025-06-03
Payer: MEDICAID

## 2025-07-29 DIAGNOSIS — F33.1 MODERATE EPISODE OF RECURRENT MAJOR DEPRESSIVE DISORDER: Chronic | ICD-10-CM

## 2025-07-29 DIAGNOSIS — F51.04 PSYCHOPHYSIOLOGICAL INSOMNIA: ICD-10-CM

## 2025-07-29 DIAGNOSIS — F41.1 GAD (GENERALIZED ANXIETY DISORDER): Chronic | ICD-10-CM

## 2025-07-30 RX ORDER — ZOLPIDEM TARTRATE 12.5 MG/1
12.5 TABLET, FILM COATED, EXTENDED RELEASE ORAL NIGHTLY PRN
Qty: 22 TABLET | Refills: 0 | Status: SHIPPED | OUTPATIENT
Start: 2025-07-30 | End: 2025-08-21

## 2025-07-30 RX ORDER — DULOXETIN HYDROCHLORIDE 60 MG/1
60 CAPSULE, DELAYED RELEASE ORAL DAILY
Qty: 90 CAPSULE | Refills: 1 | Status: SHIPPED | OUTPATIENT
Start: 2025-07-30

## 2025-07-30 RX ORDER — BUSPIRONE HYDROCHLORIDE 10 MG/1
10 TABLET ORAL 2 TIMES DAILY
Qty: 180 TABLET | Refills: 1 | Status: SHIPPED | OUTPATIENT
Start: 2025-07-30

## 2025-08-06 ENCOUNTER — E-VISIT (OUTPATIENT)
Dept: URGENT CARE | Facility: CLINIC | Age: 45
End: 2025-08-06
Payer: MEDICAID

## 2025-08-06 DIAGNOSIS — M54.9 DORSALGIA: Primary | ICD-10-CM

## 2025-08-06 RX ORDER — MELOXICAM 15 MG/1
15 TABLET ORAL DAILY PRN
Qty: 14 TABLET | Refills: 0 | Status: SHIPPED | OUTPATIENT
Start: 2025-08-06 | End: 2025-08-20

## 2025-08-06 RX ORDER — METHYLPREDNISOLONE 4 MG/1
TABLET ORAL
Qty: 21 TABLET | Refills: 0 | Status: SHIPPED | OUTPATIENT
Start: 2025-08-06 | End: 2025-08-27

## 2025-08-06 RX ORDER — TIZANIDINE 2 MG/1
2 TABLET ORAL EVERY 8 HOURS PRN
Qty: 42 TABLET | Refills: 0 | Status: SHIPPED | OUTPATIENT
Start: 2025-08-06 | End: 2025-08-20

## 2025-08-06 NOTE — PATIENT INSTRUCTIONS
Patient Education     Low Back Pain Discharge Instructions   About this topic   Low back pain is a pain or discomfort in the lower part of your back and spinal column. You may have a muscle strain. This happens when a muscle is stretched too much or works too hard. It can also happen if a muscle is stretched too quickly. This is also known as a pulled muscle. Many people have low back pain at some point and it most often gets better on its own.       What care is needed at home?   Back pain is common. In most cases, your back will feel better in 1 to 3 weeks. You may need to have help at home if you are not able to do your normal activities right away. Some people need help with things like cooking or bathing.  Ask your doctor what you need to do when you go home. Make sure you ask questions if you do not understand what the doctor says. This way you will know what you need to do.  Use heat on your back to help with pain. Put a heating pad on your back for 20 minutes at a time a few times each day. Never go to sleep with heat or ice on your back.  Stay as active as you can without causing too much pain. It is OK to rest your back for a day or so. Be sure to get up and move around gently during the day as you are able. After a few days, slowly start to increase your activity level as you are able to. If something causes your pain to come back or get worse, stop and go back to doing easier activities that did not hurt.  Protect your back.  Limit sports, twisting, and heavy lifting until you are fully recovered.  Practice good posture to lower pressure on your spine.  When lifting, hold the object close to your body, keep your back straight, and use your leg muscles to slowly stand.  Do not sit or  one position for a long time. You may want to sleep with a pillow under or between your knees if this eases your pain.  You may want to take medicine like ibuprofen or naproxen for swelling and pain. These are  nonsteroidal anti-inflammatory drugs (NSAIDs).  A lumbar support belt may help you be more comfortable. This supports your pelvis and eases pain.  Your doctor may order exercises to help your back. Be sure to do these as ordered.  What follow-up care is needed?   Your doctor may ask you to make visits to the office to check on your progress. Be sure to keep these visits. Your doctor may send you to other experts and therapists to help you with your pain.   What drugs may be needed?   The doctor may order drugs to:   Help with pain and swelling  Relax your muscles  Will physical activity be limited?   You may have to limit your activity. Talk to your doctor about the right amount of activity for you.   When do I need to call the doctor?   You are unable to walk or cannot control your bowels or bladder.  You develop a fever of 100.4°F (38°C) or higher, chills, or night sweats.  Your legs are numb, weak, or tingly.  Your pain is getting worse, even with medicines and rest.  You develop a rash.  Teach Back: Helping You Understand   The Teach Back Method helps you understand the information we are giving you. After you talk with the staff, tell them in your own words what you learned. This helps to make sure the staff has described each thing clearly. It also helps to explain things that may have been confusing. Before going home, make sure you can do these:  I can tell you about my pain.  I can tell you what may help ease my pain.  I can tell you what I will do if I have numbness or tingling in my legs, feet, or genitals.  Last Reviewed Date   2022-02-01  Consumer Information Use and Disclaimer   This generalized information is a limited summary of diagnosis, treatment, and/or medication information. It is not meant to be comprehensive and should be used as a tool to help the user understand and/or assess potential diagnostic and treatment options. It does NOT include all information about conditions, treatments,  medications, side effects, or risks that may apply to a specific patient. It is not intended to be medical advice or a substitute for the medical advice, diagnosis, or treatment of a health care provider based on the health care provider's examination and assessment of a patients specific and unique circumstances. Patients must speak with a health care provider for complete information about their health, medical questions, and treatment options, including any risks or benefits regarding use of medications. This information does not endorse any treatments or medications as safe, effective, or approved for treating a specific patient. UpToDate, Inc. and its affiliates disclaim any warranty or liability relating to this information or the use thereof. The use of this information is governed by the Terms of Use, available at https://www.[employer].com/en/know/clinical-effectiveness-terms   Copyright   Copyright © 2023 UpToDate, Inc. and its affiliates and/or licensors. All rights reserved.

## 2025-08-06 NOTE — PROGRESS NOTES
Patient ID: Arnoldo Crane Jr. is a 45 y.o. male.    Chief Complaint: General Illness (Entered automatically based on patient selection in [employer].)          274}  The patient initiated a request through [employer] on 8/6/2025 for evaluation and management with a chief complaint of General Illness (Entered automatically based on patient selection in [employer].)     I evaluated the questionnaire submission on 08/06/2025 .    Total Time (in minutes): 13     Oh[facility] Peq Evisit Supergroup-Muscle,Back,Joint    8/6/2025 11:47 AM CDT - Filed by Patient   What do you need help with? Back Problem   Do you agree to participate in an E-Visit? Yes   If you have any of the following symptoms, please present to your local emergency room or call 911: I acknowledge   What is the main issue you would like addressed today? Lower back pain   Where is your back pain located? (Upper Back, Middle Back, Lower Back, Buttocks) Lower Back   Does the pain extend into your legs? (Left leg, Right leg, Both legs, None) Both legs   On a scale of 1-10, where 10 is the worst pain imaginable, how severe is your back pain? 9   Did you have an injury that could have caused the pain? Yes   Please describe how you were injured. 3 years ago I was injured on the job. I've been suffering with this for a while. Now its bad and I cant walk much. I don't have a doctor to treat me.   How long have you had back pain? (Just today, About a week, About a month, More than a month) More than a month   Have you experienced similar back pain in the past? (Yes, Sometimes, This pain is new, Never) Yes   Please list any medications or treatments you have used for back pain and indicate if it was effective or not. None.   Do you have a fever? (101°F or under, Over 101°F, No, Not sure) No   Do you have any of the following? (Areas that are numb, tingle, or feel strange, Discomfort or trouble when urinating, Fatigue, New loss of bowel or bladder control, Loss of appetite,  Unexpected weight loss, Weakness, Rash in area of pain, None) Areas that are numb, tingle, or feel strange   What makes the pain worse? (Any movement, Bending over, Sitting down, Intense activity, Other, None of the above) Any movement;  Bending over;  Sitting down   What makes the pain better? (Hot or cold compress, Leaning forward, Lying in bed, Over the counter pain medicine, Prescription pain medicine, Other, None) Hot or cold compress   Have you ever been diagnosed with cancer? No   Have you ever been diagnosed with arthritis of the spine (also called degenerative disc disease)? No   Have you ever been diagnosed with osteoporosis or any other condition that causes weak bones? No   Have you ever had surgery on your back or spine? No   How would you describe your usual activity level? (Active - regular exercise/physical activity, Moderately active - some movement, but no regular exercise, Sedentary - mostly sitting, little activity, Limited mobility - restricted movement) Moderately active - some movement, but no regular exercise   Provide any information you feel is important to your history not asked above Teri had a mri done through [facility] in [place]   Please attach any relevant images or files    Are you able to take your vitals? No          Active Problem List with Overview Notes    Diagnosis Date Noted    Drug-induced erectile dysfunction 08/14/2024    Gastroesophageal reflux disease 07/15/2024    Class 3 severe obesity due to excess calories with serious comorbidity and body mass index (BMI) of 40.0 to 44.9 in adult 11/06/2023    Encounter for counseling for sleep apnea 11/06/2023    Other secondary hypertension 11/06/2023    Moderate episode of recurrent major depressive disorder 10/18/2023    DOUG (generalized anxiety disorder) 10/18/2023    Type 2 diabetes mellitus with hyperglycemia, without long-term current use of insulin 10/11/2023    Psychophysiological insomnia 10/11/2023    Positive  depression screening 10/11/2023     I have reviewed the positive depression score which warrants active treatment with psychotherapy and/or medications.      Anxiety and depression 10/11/2023    Tinea pedis of both feet 10/11/2023      Recent Labs Obtained:  Lab Results   Component Value Date    WBC 6.05 09/29/2023    HGB 14.9 09/29/2023    HCT 42.8 09/29/2023    MCV 82.9 09/29/2023     09/29/2023     10/16/2024    K 4.1 10/16/2024     (H) 10/16/2024    CREATININE 1.09 10/16/2024    EGFRNORACEVR >60 10/16/2024    HGBA1C 5.3 07/15/2024    TSH 2.418 11/06/2023      Review of patient's allergies indicates:   Allergen Reactions    Metformin Rash       Encounter Diagnosis   Name Primary?    Dorsalgia Yes        No orders of the defined types were placed in this encounter.     Medications Ordered This Encounter   Medications    meloxicam (MOBIC) 15 MG tablet     Sig: Take 1 tablet (15 mg total) by mouth daily as needed for Pain.     Dispense:  14 tablet     Refill:  0    methylPREDNISolone (MEDROL, DAVID,) 4 mg tablet     Sig: Follow package instructions     Dispense:  21 tablet     Refill:  0    tiZANidine (ZANAFLEX) 2 MG tablet     Sig: Take 1 tablet (2 mg total) by mouth every 8 (eight) hours as needed (back pain).     Dispense:  42 tablet     Refill:  0        E-Visit Time Tracking:    Day 1 Time (in minutes): 13    Total Time (in minutes): 13      274}        02600        21+ min.   (This text will automatically delete):[id number]}

## 2025-08-19 ENCOUNTER — TELEPHONE (OUTPATIENT)
Dept: BEHAVIORAL HEALTH | Facility: CLINIC | Age: 45
End: 2025-08-19
Payer: MEDICAID

## 2025-08-21 ENCOUNTER — OFFICE VISIT (OUTPATIENT)
Dept: BEHAVIORAL HEALTH | Facility: CLINIC | Age: 45
End: 2025-08-21
Payer: MEDICAID

## 2025-08-21 ENCOUNTER — TELEPHONE (OUTPATIENT)
Dept: BEHAVIORAL HEALTH | Facility: CLINIC | Age: 45
End: 2025-08-21
Payer: MEDICAID

## 2025-08-21 VITALS
BODY MASS INDEX: 42.66 KG/M2 | SYSTOLIC BLOOD PRESSURE: 122 MMHG | WEIGHT: 315 LBS | DIASTOLIC BLOOD PRESSURE: 80 MMHG | HEIGHT: 72 IN

## 2025-08-21 DIAGNOSIS — F41.1 GAD (GENERALIZED ANXIETY DISORDER): Chronic | ICD-10-CM

## 2025-08-21 DIAGNOSIS — F51.04 PSYCHOPHYSIOLOGICAL INSOMNIA: ICD-10-CM

## 2025-08-21 DIAGNOSIS — F33.1 MODERATE EPISODE OF RECURRENT MAJOR DEPRESSIVE DISORDER: Primary | Chronic | ICD-10-CM

## 2025-08-21 PROCEDURE — 1159F MED LIST DOCD IN RCRD: CPT | Mod: CPTII,,, | Performed by: NURSE PRACTITIONER

## 2025-08-21 PROCEDURE — 4010F ACE/ARB THERAPY RXD/TAKEN: CPT | Mod: CPTII,,, | Performed by: NURSE PRACTITIONER

## 2025-08-21 PROCEDURE — 1160F RVW MEDS BY RX/DR IN RCRD: CPT | Mod: CPTII,,, | Performed by: NURSE PRACTITIONER

## 2025-08-21 PROCEDURE — 99213 OFFICE O/P EST LOW 20 MIN: CPT | Mod: PBBFAC,PN | Performed by: NURSE PRACTITIONER

## 2025-08-21 PROCEDURE — 3008F BODY MASS INDEX DOCD: CPT | Mod: CPTII,,, | Performed by: NURSE PRACTITIONER

## 2025-08-21 PROCEDURE — 3074F SYST BP LT 130 MM HG: CPT | Mod: CPTII,,, | Performed by: NURSE PRACTITIONER

## 2025-08-21 PROCEDURE — 99214 OFFICE O/P EST MOD 30 MIN: CPT | Mod: S$PBB,,, | Performed by: NURSE PRACTITIONER

## 2025-08-21 PROCEDURE — 3079F DIAST BP 80-89 MM HG: CPT | Mod: CPTII,,, | Performed by: NURSE PRACTITIONER

## 2025-08-21 RX ORDER — ALPRAZOLAM 0.5 MG/1
0.5 TABLET ORAL 2 TIMES DAILY PRN
Qty: 30 TABLET | Refills: 0 | Status: SHIPPED | OUTPATIENT
Start: 2025-08-21

## 2025-08-21 RX ORDER — BUSPIRONE HYDROCHLORIDE 10 MG/1
TABLET ORAL
Qty: 60 TABLET | Refills: 3 | Status: SHIPPED | OUTPATIENT
Start: 2025-08-21

## 2025-08-21 RX ORDER — DULOXETIN HYDROCHLORIDE 30 MG/1
90 CAPSULE, DELAYED RELEASE ORAL DAILY
Qty: 90 CAPSULE | Refills: 3 | Status: SHIPPED | OUTPATIENT
Start: 2025-08-21

## 2025-08-21 RX ORDER — BUSPIRONE HYDROCHLORIDE 10 MG/1
20 TABLET ORAL NIGHTLY
Qty: 60 TABLET | Refills: 3 | Status: SHIPPED | OUTPATIENT
Start: 2025-08-21

## 2025-09-02 DIAGNOSIS — M54.9 DORSALGIA: ICD-10-CM

## 2025-09-03 RX ORDER — TIZANIDINE 2 MG/1
2 TABLET ORAL EVERY 8 HOURS PRN
Qty: 42 TABLET | Refills: 0 | OUTPATIENT
Start: 2025-09-03

## 2025-09-03 RX ORDER — MELOXICAM 15 MG/1
15 TABLET ORAL DAILY PRN
Qty: 14 TABLET | Refills: 0 | OUTPATIENT
Start: 2025-09-03